# Patient Record
Sex: MALE | Race: WHITE | NOT HISPANIC OR LATINO | Employment: FULL TIME | URBAN - METROPOLITAN AREA
[De-identification: names, ages, dates, MRNs, and addresses within clinical notes are randomized per-mention and may not be internally consistent; named-entity substitution may affect disease eponyms.]

---

## 2017-10-01 ENCOUNTER — HOSPITAL ENCOUNTER (EMERGENCY)
Facility: CLINIC | Age: 42
Discharge: HOME | End: 2017-10-01
Attending: EMERGENCY MEDICINE | Admitting: EMERGENCY MEDICINE
Payer: COMMERCIAL

## 2017-10-01 VITALS
WEIGHT: 200 LBS | SYSTOLIC BLOOD PRESSURE: 129 MMHG | RESPIRATION RATE: 18 BRPM | OXYGEN SATURATION: 96 % | DIASTOLIC BLOOD PRESSURE: 99 MMHG | TEMPERATURE: 97.7 F | BODY MASS INDEX: 29.97 KG/M2

## 2017-10-01 DIAGNOSIS — R11.10 VOMITING AND DIARRHEA: ICD-10-CM

## 2017-10-01 DIAGNOSIS — R19.7 VOMITING AND DIARRHEA: ICD-10-CM

## 2017-10-01 LAB
ANION GAP SERPL CALCULATED.3IONS-SCNC: 7 MMOL/L (ref 3–14)
BASOPHILS # BLD AUTO: 0 10E9/L (ref 0–0.2)
BASOPHILS NFR BLD AUTO: 0.3 %
BUN SERPL-MCNC: 17 MG/DL (ref 7–30)
CALCIUM SERPL-MCNC: 9.5 MG/DL (ref 8.5–10.1)
CHLORIDE SERPL-SCNC: 103 MMOL/L (ref 94–109)
CO2 SERPL-SCNC: 27 MMOL/L (ref 20–32)
CREAT SERPL-MCNC: 1.01 MG/DL (ref 0.66–1.25)
DIFFERENTIAL METHOD BLD: NORMAL
EOSINOPHIL # BLD AUTO: 0.1 10E9/L (ref 0–0.7)
EOSINOPHIL NFR BLD AUTO: 1.1 %
ERYTHROCYTE [DISTWIDTH] IN BLOOD BY AUTOMATED COUNT: 13.5 % (ref 10–15)
GFR SERPL CREATININE-BSD FRML MDRD: 80 ML/MIN/1.7M2
GLUCOSE SERPL-MCNC: 138 MG/DL (ref 70–99)
HCT VFR BLD AUTO: 50.1 % (ref 40–53)
HGB BLD-MCNC: 17.3 G/DL (ref 13.3–17.7)
IMM GRANULOCYTES # BLD: 0 10E9/L (ref 0–0.4)
IMM GRANULOCYTES NFR BLD: 0.3 %
LYMPHOCYTES # BLD AUTO: 2.7 10E9/L (ref 0.8–5.3)
LYMPHOCYTES NFR BLD AUTO: 35.3 %
MCH RBC QN AUTO: 31.1 PG (ref 26.5–33)
MCHC RBC AUTO-ENTMCNC: 34.5 G/DL (ref 31.5–36.5)
MCV RBC AUTO: 90 FL (ref 78–100)
MONOCYTES # BLD AUTO: 0.3 10E9/L (ref 0–1.3)
MONOCYTES NFR BLD AUTO: 4.5 %
NEUTROPHILS # BLD AUTO: 4.4 10E9/L (ref 1.6–8.3)
NEUTROPHILS NFR BLD AUTO: 58.5 %
NRBC # BLD AUTO: 0 10*3/UL
NRBC BLD AUTO-RTO: 0 /100
PLATELET # BLD AUTO: 286 10E9/L (ref 150–450)
POTASSIUM SERPL-SCNC: 4.1 MMOL/L (ref 3.4–5.3)
RBC # BLD AUTO: 5.56 10E12/L (ref 4.4–5.9)
SODIUM SERPL-SCNC: 137 MMOL/L (ref 133–144)
WBC # BLD AUTO: 7.5 10E9/L (ref 4–11)

## 2017-10-01 PROCEDURE — 96375 TX/PRO/DX INJ NEW DRUG ADDON: CPT

## 2017-10-01 PROCEDURE — 25000128 H RX IP 250 OP 636: Performed by: EMERGENCY MEDICINE

## 2017-10-01 PROCEDURE — 85025 COMPLETE CBC W/AUTO DIFF WBC: CPT | Performed by: EMERGENCY MEDICINE

## 2017-10-01 PROCEDURE — 96361 HYDRATE IV INFUSION ADD-ON: CPT

## 2017-10-01 PROCEDURE — 99284 EMERGENCY DEPT VISIT MOD MDM: CPT | Mod: 25

## 2017-10-01 PROCEDURE — 80048 BASIC METABOLIC PNL TOTAL CA: CPT | Performed by: EMERGENCY MEDICINE

## 2017-10-01 PROCEDURE — 96374 THER/PROPH/DIAG INJ IV PUSH: CPT

## 2017-10-01 RX ORDER — METOCLOPRAMIDE HYDROCHLORIDE 5 MG/ML
10 INJECTION INTRAMUSCULAR; INTRAVENOUS ONCE
Status: COMPLETED | OUTPATIENT
Start: 2017-10-01 | End: 2017-10-01

## 2017-10-01 RX ORDER — ONDANSETRON 2 MG/ML
4 INJECTION INTRAMUSCULAR; INTRAVENOUS
Status: DISCONTINUED | OUTPATIENT
Start: 2017-10-01 | End: 2017-10-01 | Stop reason: HOSPADM

## 2017-10-01 RX ORDER — ONDANSETRON 4 MG/1
4 TABLET, ORALLY DISINTEGRATING ORAL EVERY 8 HOURS PRN
Qty: 10 TABLET | Refills: 0 | Status: SHIPPED | OUTPATIENT
Start: 2017-10-01 | End: 2017-10-04

## 2017-10-01 RX ORDER — DIPHENHYDRAMINE HYDROCHLORIDE 50 MG/ML
25 INJECTION INTRAMUSCULAR; INTRAVENOUS ONCE
Status: COMPLETED | OUTPATIENT
Start: 2017-10-01 | End: 2017-10-01

## 2017-10-01 RX ADMIN — SODIUM CHLORIDE 1000 ML: 9 INJECTION, SOLUTION INTRAVENOUS at 03:00

## 2017-10-01 RX ADMIN — DIPHENHYDRAMINE HYDROCHLORIDE 25 MG: 50 INJECTION, SOLUTION INTRAMUSCULAR; INTRAVENOUS at 03:42

## 2017-10-01 RX ADMIN — METOCLOPRAMIDE 10 MG: 5 INJECTION, SOLUTION INTRAMUSCULAR; INTRAVENOUS at 03:41

## 2017-10-01 NOTE — LETTER
To Whom it may concern:      Maximino KATHI Bone was seen in our Emergency Department today, 10/01/17. Please excuse him from work through 10/2/17.      Sincerely,      Unruly Ivan MD

## 2017-10-01 NOTE — ED AVS SNAPSHOT
[facility] Emergency Department    [address]    [facility] [address]    Phone:  [phone]    Fax:  [phone]                                       Maximino Bone   MRN: [id number]    Department:  [facility] Emergency Department   Date of Visit:  10/1/2017           After Visit Summary Signature Page     I have received my discharge instructions, and my questions have been answered. I have discussed any challenges I see with this plan with the nurse or doctor.    ..........................................................................................................................................  Patient/Patient Representative Signature      ..........................................................................................................................................  Patient Representative Print Name and Relationship to Patient    ..................................................               ................................................  Date                                            Time    ..........................................................................................................................................  Reviewed by Signature/Title    ...................................................              ..............................................  Date                                                            Time

## 2017-10-01 NOTE — ED NOTES
"Pt stated he ate \"bad steak\" at 1730 last night and started having nausea about 1800.  Worsening nausea and vomiting with weakness since then.  4 zofran given via EMS  "

## 2017-10-01 NOTE — DISCHARGE INSTRUCTIONS
Discharge Instructions  Vomiting and Diarrhea    You have been seen today for vomiting and diarrhea. This is usually caused by a virus, but some bacteria, parasites, medicines or other medical conditions can cause similar symptoms. At this time your doctor does not find that your vomiting and diarrhea is a sign of anything dangerous or life-threatening.  However, sometimes the signs of serious illness do not show up right away.  If you have new or worse symptoms, you may need to be seen again in the emergency department or by your primary doctor. Remember that serious problems like appendicitis can look like gastroenteritis at first.       Return to the Emergency Department if:    You keep throwing up and you are not able to keep liquids down.    You feel you are getting dehydrated, such as being very thirsty, not urinating at least every 8-12 hours, or feeling faint or lightheaded.     You develop a new fever, or your fever continues for more than 2 days.    You have belly pain that seems worse than cramps, is in one spot, or is getting worse over time.     You have blood in your vomit or in your diarrhea.    You feel very weak     You are not starting to improve within 24 hours of your visit here    What can I do to help myself?    The most important thing to do is to drink clear liquids.  If you have been vomiting a lot, it is best to have only small, frequent sips of liquids.  Drinking too much at once may cause more vomiting. If you are vomiting often, you must replace minerals, sodium and potassium lost with your illness. Pedialyte  and sports drinks can help you replace these minerals.  You can also drink clear liquids such as water, weak tea, apple juice, and 7-up. Avoid acid liquids (orange), caffeine (coffee) or alcohol. Do not drink milk until you no longer have diarrhea.    After liquids are staying down, you may start eating mild foods. Soda crackers, toast, plain noodles, gelatin, applesauce and  bananas are good first choices.  Avoid foods that have acid, are spicy, fatty or fibrous (such as meats, coarse grains, vegetables). You may start eating these foods again in about 3 days when you are better.    Sometimes treatment includes prescription medicine to prevent nausea and vomiting and to prevent diarrhea. If your doctor prescribes these for you, take them as directed.    Nonprescription medicine is available for the treatment of diarrhea and can be very effective.  If you use it, make sure you use the dose recommended on the package.  Avoid Lomotil. Check with your healthcare provider before you use any medicine for diarrhea.    Don t take ibuprofen, or other nonsteroidal anti-inflammatory medicines without checking with your healthcare provider.      Remember that you can always come back to the Emergency Department if you are not able to see your regular doctor in the amount of time listed above, if you get any new symptoms, or if there is anything that worries you.

## 2017-10-01 NOTE — ED PROVIDER NOTES
History     Chief Complaint:  Nausea & Vomiting      HPI   Maximino Bone is a 42 year old male who presents for evaluation of nausea, vomiting, and diarrhea. He notes that he began to experience nausea after eating a steak last night at 5:30. Since then he has had multiple episodes of uncontrolled vomiting, and one episode of diarrhea. He denies fevers, abdominal, or any other concerns.    Allergies:  No known drug allergies.      Medications:    The patient is currently on no regular medications.     Past Medical History:    Anxiety   Hyperlipidemia    Past Surgical History:    History reviewed. No pertinent past surgical history.     Family History:    Diabetes--Mother  Hypertension--Mother   Autism--Son  ADD--Daughter     Social History:  Marital Status: Single  Tobacco Use: Never smoker, current chewing tobacco user  Alcohol Use: Socially   PCP: Jackie Kumar     Review of Systems  GI: Positive as per above  All other systems reviewed and negative      Physical Exam   First Vitals:  BP: (!) 145/107  Heart Rate: 75  Resp: 18  Weight: 90.7 kg (200 lb)  SpO2: 94 %      Physical Exam  Constitutional: Alert, attentive  HENT:    Nose: Nose normal.    Mouth/Throat: Oropharynx is clear, mucous membranes are moist  Eyes:  EOM are normal. Pupils are equal, round, and reactive to light.   CV:  regular rate and rhythm; no murmurs, rubs or gallups  Chest: Effort normal and breath sounds normal.   GI:   Epigastrium - no tenderness, no guarding   RUQ - no tenderness or Schroeder's sign   RLQ - No tenderness, no guarding, no Rovsing's sign   Suprapubic area - no tenderness, no guarding    LLQ - no tenderness, no guarding   LUQ - no tenderness, no guarding   No distension. Normal bowel sounds  MSK: Normal range of motion.   Neurological: Alert, attentive  Skin: Skin is warm and dry.      Emergency Department Course     Laboratory:  BMP: Glc 138, otherwise WNL (Creatinine 1.01)  CBC:  WBC 7.5, HGB 17.3, , otherwise  WNL     Interventions:  NS 1 Liter  Benadryl 25 mg IV  Reglan 10 mg IV  Zofran 4 mg IV    Emergency Department Course:  Nursing notes and vitals reviewed.  I performed an exam of the patient as documented above.   A peripheral IV was established. Blood was drawn from the patient. This was sent for laboratory testing, findings above.      Recheck of 04:20: The patient is feeling significantly better, soft nontender abdominal exam.    Impression & Plan        Medical Decision Making:  Maximino Bone is a 42 year old male who presents with acute nausea, vomiting and diarrhea, likely related to food poisoning given the history above. He has a benign abdominal exam without focal RLQ or LLQ tenderness to suggest diverticulitis or appendicitis, respectively, or other acute abdominal process. I did discuss the sometimes vague initial constellation of symptoms early in the course of acute abdominal processes, and the need for immediate return should pain or other concerning symptoms develop.  Symptoms are improved after IV fluids and symptomatic treatment. Vital signs have remained normal and stable throughout the ED course, and the abdominal re-exam remains benign. I believe he is safe for discharge in improved condition at this time with strict return precautions for recurrent vomiting, pain, fever or any other concerning symptoms.    Diagnosis:    ICD-10-CM    1. Vomiting and diarrhea R11.10     R19.7      Disposition:  Home in improved condition      Unruly Ivan MD  [facility] Emergency Department      Scribe disclosure:  I, Bhaskar Ness, am serving as a scribe on 10/1/2017 at 3:11 AM to personally document services performed by Dr. Ivan based on my observations and the provider's statements to me.    [facility] EMERGENCY DEPARTMENT       Unruly Ivan MD  10/01/17 0614

## 2017-12-17 ENCOUNTER — APPOINTMENT (OUTPATIENT)
Dept: CT IMAGING | Facility: CLINIC | Age: 42
DRG: 603 | End: 2017-12-17
Attending: EMERGENCY MEDICINE
Payer: COMMERCIAL

## 2017-12-17 ENCOUNTER — HOSPITAL ENCOUNTER (INPATIENT)
Facility: CLINIC | Age: 42
LOS: 1 days | Discharge: HOME | DRG: 603 | End: 2017-12-18
Attending: EMERGENCY MEDICINE | Admitting: INTERNAL MEDICINE
Payer: COMMERCIAL

## 2017-12-17 DIAGNOSIS — L03.211 FACIAL CELLULITIS: ICD-10-CM

## 2017-12-17 LAB
ALBUMIN SERPL-MCNC: 3.4 G/DL (ref 3.4–5)
ALP SERPL-CCNC: 102 U/L (ref 40–150)
ALT SERPL W P-5'-P-CCNC: 29 U/L (ref 0–70)
ANION GAP SERPL CALCULATED.3IONS-SCNC: 7 MMOL/L (ref 3–14)
AST SERPL W P-5'-P-CCNC: 27 U/L (ref 0–45)
BASOPHILS # BLD AUTO: 0.1 10E9/L (ref 0–0.2)
BASOPHILS NFR BLD AUTO: 0.5 %
BILIRUB SERPL-MCNC: 1.2 MG/DL (ref 0.2–1.3)
BUN SERPL-MCNC: 11 MG/DL (ref 7–30)
CALCIUM SERPL-MCNC: 8.3 MG/DL (ref 8.5–10.1)
CHLORIDE SERPL-SCNC: 102 MMOL/L (ref 94–109)
CO2 SERPL-SCNC: 25 MMOL/L (ref 20–32)
CREAT SERPL-MCNC: 0.81 MG/DL (ref 0.66–1.25)
DIFFERENTIAL METHOD BLD: ABNORMAL
EOSINOPHIL # BLD AUTO: 0.1 10E9/L (ref 0–0.7)
EOSINOPHIL NFR BLD AUTO: 1 %
ERYTHROCYTE [DISTWIDTH] IN BLOOD BY AUTOMATED COUNT: 12.7 % (ref 10–15)
GFR SERPL CREATININE-BSD FRML MDRD: >90 ML/MIN/1.7M2
GLUCOSE SERPL-MCNC: 104 MG/DL (ref 70–99)
HCT VFR BLD AUTO: 41.4 % (ref 40–53)
HGB BLD-MCNC: 14.6 G/DL (ref 13.3–17.7)
IMM GRANULOCYTES # BLD: 0 10E9/L (ref 0–0.4)
IMM GRANULOCYTES NFR BLD: 0.3 %
LACTATE BLD-SCNC: 0.9 MMOL/L (ref 0.7–2)
LYMPHOCYTES # BLD AUTO: 1.6 10E9/L (ref 0.8–5.3)
LYMPHOCYTES NFR BLD AUTO: 15 %
MCH RBC QN AUTO: 31.6 PG (ref 26.5–33)
MCHC RBC AUTO-ENTMCNC: 35.3 G/DL (ref 31.5–36.5)
MCV RBC AUTO: 90 FL (ref 78–100)
MONOCYTES # BLD AUTO: 1.4 10E9/L (ref 0–1.3)
MONOCYTES NFR BLD AUTO: 13 %
NEUTROPHILS # BLD AUTO: 7.3 10E9/L (ref 1.6–8.3)
NEUTROPHILS NFR BLD AUTO: 70.2 %
NRBC # BLD AUTO: 0 10*3/UL
NRBC BLD AUTO-RTO: 0 /100
PLATELET # BLD AUTO: 223 10E9/L (ref 150–450)
POTASSIUM SERPL-SCNC: 3.7 MMOL/L (ref 3.4–5.3)
PROT SERPL-MCNC: 7.5 G/DL (ref 6.8–8.8)
RBC # BLD AUTO: 4.62 10E12/L (ref 4.4–5.9)
SODIUM SERPL-SCNC: 134 MMOL/L (ref 133–144)
WBC # BLD AUTO: 10.4 10E9/L (ref 4–11)

## 2017-12-17 PROCEDURE — 25000128 H RX IP 250 OP 636: Performed by: EMERGENCY MEDICINE

## 2017-12-17 PROCEDURE — 36415 COLL VENOUS BLD VENIPUNCTURE: CPT | Performed by: EMERGENCY MEDICINE

## 2017-12-17 PROCEDURE — 96365 THER/PROPH/DIAG IV INF INIT: CPT | Mod: 59

## 2017-12-17 PROCEDURE — 83605 ASSAY OF LACTIC ACID: CPT | Performed by: EMERGENCY MEDICINE

## 2017-12-17 PROCEDURE — 25000132 ZZH RX MED GY IP 250 OP 250 PS 637: Performed by: INTERNAL MEDICINE

## 2017-12-17 PROCEDURE — 25000125 ZZHC RX 250: Performed by: INTERNAL MEDICINE

## 2017-12-17 PROCEDURE — 87040 BLOOD CULTURE FOR BACTERIA: CPT | Performed by: EMERGENCY MEDICINE

## 2017-12-17 PROCEDURE — 70487 CT MAXILLOFACIAL W/DYE: CPT

## 2017-12-17 PROCEDURE — 12000011 ZZH R&B MS OVERFLOW

## 2017-12-17 PROCEDURE — 36415 COLL VENOUS BLD VENIPUNCTURE: CPT

## 2017-12-17 PROCEDURE — 80053 COMPREHEN METABOLIC PANEL: CPT | Performed by: EMERGENCY MEDICINE

## 2017-12-17 PROCEDURE — 99223 1ST HOSP IP/OBS HIGH 75: CPT | Performed by: INTERNAL MEDICINE

## 2017-12-17 PROCEDURE — 99285 EMERGENCY DEPT VISIT HI MDM: CPT | Mod: 25

## 2017-12-17 PROCEDURE — 85025 COMPLETE CBC W/AUTO DIFF WBC: CPT | Performed by: EMERGENCY MEDICINE

## 2017-12-17 PROCEDURE — 96375 TX/PRO/DX INJ NEW DRUG ADDON: CPT

## 2017-12-17 RX ORDER — DEXAMETHASONE SODIUM PHOSPHATE 4 MG/ML
4 INJECTION, SOLUTION INTRA-ARTICULAR; INTRALESIONAL; INTRAMUSCULAR; INTRAVENOUS; SOFT TISSUE EVERY 6 HOURS
Status: DISCONTINUED | OUTPATIENT
Start: 2017-12-18 | End: 2017-12-18 | Stop reason: HOSPADM

## 2017-12-17 RX ORDER — HYDROMORPHONE HYDROCHLORIDE 1 MG/ML
0.5 INJECTION, SOLUTION INTRAMUSCULAR; INTRAVENOUS; SUBCUTANEOUS ONCE
Status: COMPLETED | OUTPATIENT
Start: 2017-12-17 | End: 2017-12-17

## 2017-12-17 RX ORDER — IBUPROFEN 600 MG/1
600 TABLET, FILM COATED ORAL EVERY 6 HOURS PRN
Status: DISCONTINUED | OUTPATIENT
Start: 2017-12-17 | End: 2017-12-18 | Stop reason: HOSPADM

## 2017-12-17 RX ORDER — IOPAMIDOL 755 MG/ML
500 INJECTION, SOLUTION INTRAVASCULAR ONCE
Status: COMPLETED | OUTPATIENT
Start: 2017-12-17 | End: 2017-12-17

## 2017-12-17 RX ORDER — AMOXICILLIN AND CLAVULANATE POTASSIUM 562.5; 437.5; 62.5 MG/1; MG/1; MG/1
2 TABLET, MULTILAYER, EXTENDED RELEASE ORAL 2 TIMES DAILY
COMMUNITY
End: 2017-12-17

## 2017-12-17 RX ORDER — DEXAMETHASONE SODIUM PHOSPHATE 10 MG/ML
6 INJECTION, SOLUTION INTRAMUSCULAR; INTRAVENOUS ONCE
Status: COMPLETED | OUTPATIENT
Start: 2017-12-17 | End: 2017-12-17

## 2017-12-17 RX ORDER — ONDANSETRON 2 MG/ML
4 INJECTION INTRAMUSCULAR; INTRAVENOUS EVERY 6 HOURS PRN
Status: DISCONTINUED | OUTPATIENT
Start: 2017-12-17 | End: 2017-12-18 | Stop reason: HOSPADM

## 2017-12-17 RX ORDER — ACETAMINOPHEN 325 MG/1
650 TABLET ORAL EVERY 4 HOURS PRN
Status: DISCONTINUED | OUTPATIENT
Start: 2017-12-17 | End: 2017-12-18 | Stop reason: HOSPADM

## 2017-12-17 RX ORDER — PROCHLORPERAZINE MALEATE 5 MG
10 TABLET ORAL EVERY 6 HOURS PRN
Status: DISCONTINUED | OUTPATIENT
Start: 2017-12-17 | End: 2017-12-18 | Stop reason: HOSPADM

## 2017-12-17 RX ORDER — LORAZEPAM 0.5 MG/1
1 TABLET ORAL EVERY 8 HOURS PRN
Status: DISCONTINUED | OUTPATIENT
Start: 2017-12-17 | End: 2017-12-17

## 2017-12-17 RX ORDER — ALBUTEROL SULFATE 90 UG/1
2 AEROSOL, METERED RESPIRATORY (INHALATION)
Status: DISCONTINUED | OUTPATIENT
Start: 2017-12-17 | End: 2017-12-18 | Stop reason: HOSPADM

## 2017-12-17 RX ORDER — IBUPROFEN 600 MG/1
TABLET, FILM COATED ORAL EVERY 6 HOURS PRN
COMMUNITY

## 2017-12-17 RX ORDER — HYDROCODONE BITARTRATE AND ACETAMINOPHEN 5; 325 MG/1; MG/1
1-2 TABLET ORAL EVERY 6 HOURS PRN
Status: ON HOLD | COMMUNITY
End: 2017-12-18

## 2017-12-17 RX ORDER — AMPICILLIN AND SULBACTAM 2; 1 G/1; G/1
3 INJECTION, POWDER, FOR SOLUTION INTRAMUSCULAR; INTRAVENOUS EVERY 6 HOURS
Status: DISCONTINUED | OUTPATIENT
Start: 2017-12-18 | End: 2017-12-18 | Stop reason: HOSPADM

## 2017-12-17 RX ORDER — CLINDAMYCIN PHOSPHATE 900 MG/50ML
900 INJECTION, SOLUTION INTRAVENOUS EVERY 8 HOURS
Status: DISCONTINUED | OUTPATIENT
Start: 2017-12-17 | End: 2017-12-18 | Stop reason: HOSPADM

## 2017-12-17 RX ORDER — AMPICILLIN AND SULBACTAM 2; 1 G/1; G/1
3 INJECTION, POWDER, FOR SOLUTION INTRAMUSCULAR; INTRAVENOUS ONCE
Status: COMPLETED | OUTPATIENT
Start: 2017-12-17 | End: 2017-12-17

## 2017-12-17 RX ORDER — NALOXONE HYDROCHLORIDE 0.4 MG/ML
.1-.4 INJECTION, SOLUTION INTRAMUSCULAR; INTRAVENOUS; SUBCUTANEOUS
Status: DISCONTINUED | OUTPATIENT
Start: 2017-12-17 | End: 2017-12-18 | Stop reason: HOSPADM

## 2017-12-17 RX ORDER — HYDROMORPHONE HYDROCHLORIDE 1 MG/ML
.3-.5 INJECTION, SOLUTION INTRAMUSCULAR; INTRAVENOUS; SUBCUTANEOUS
Status: DISCONTINUED | OUTPATIENT
Start: 2017-12-17 | End: 2017-12-18 | Stop reason: HOSPADM

## 2017-12-17 RX ORDER — ONDANSETRON 4 MG/1
4 TABLET, ORALLY DISINTEGRATING ORAL EVERY 6 HOURS PRN
Status: DISCONTINUED | OUTPATIENT
Start: 2017-12-17 | End: 2017-12-18 | Stop reason: HOSPADM

## 2017-12-17 RX ORDER — KETOROLAC TROMETHAMINE 30 MG/ML
30 INJECTION, SOLUTION INTRAMUSCULAR; INTRAVENOUS ONCE
Status: COMPLETED | OUTPATIENT
Start: 2017-12-17 | End: 2017-12-17

## 2017-12-17 RX ORDER — PROCHLORPERAZINE 25 MG
25 SUPPOSITORY, RECTAL RECTAL EVERY 12 HOURS PRN
Status: DISCONTINUED | OUTPATIENT
Start: 2017-12-17 | End: 2017-12-18 | Stop reason: HOSPADM

## 2017-12-17 RX ADMIN — AMPICILLIN SODIUM AND SULBACTAM SODIUM 3 G: 2; 1 INJECTION, POWDER, FOR SOLUTION INTRAMUSCULAR; INTRAVENOUS at 19:37

## 2017-12-17 RX ADMIN — SODIUM CHLORIDE 60 ML: 9 INJECTION, SOLUTION INTRAVENOUS at 17:42

## 2017-12-17 RX ADMIN — IOPAMIDOL 80 ML: 755 INJECTION, SOLUTION INTRAVENOUS at 17:42

## 2017-12-17 RX ADMIN — IBUPROFEN 600 MG: 600 TABLET ORAL at 22:54

## 2017-12-17 RX ADMIN — DEXAMETHASONE SODIUM PHOSPHATE 6 MG: 10 INJECTION, SOLUTION INTRAMUSCULAR; INTRAVENOUS at 19:38

## 2017-12-17 RX ADMIN — Medication 0.5 MG: at 21:09

## 2017-12-17 RX ADMIN — KETOROLAC TROMETHAMINE 30 MG: 30 INJECTION, SOLUTION INTRAMUSCULAR at 17:18

## 2017-12-17 RX ADMIN — CLINDAMYCIN PHOSPHATE 900 MG: 18 INJECTION, SOLUTION INTRAVENOUS at 22:04

## 2017-12-17 ASSESSMENT — ACTIVITIES OF DAILY LIVING (ADL)
RETIRED_EATING: 0-->INDEPENDENT
AMBULATION: 0-->INDEPENDENT
RETIRED_COMMUNICATION: 0-->UNDERSTANDS/COMMUNICATES WITHOUT DIFFICULTY
TRANSFERRING: 0-->INDEPENDENT
BATHING: 0-->INDEPENDENT
TOILETING: 0-->INDEPENDENT
FALL_HISTORY_WITHIN_LAST_SIX_MONTHS: NO
DRESS: 0-->INDEPENDENT
SWALLOWING: 0-->SWALLOWS FOODS/LIQUIDS WITHOUT DIFFICULTY
COGNITION: 0 - NO COGNITION ISSUES REPORTED

## 2017-12-17 ASSESSMENT — ENCOUNTER SYMPTOMS
FACIAL SWELLING: 1
FEVER: 0

## 2017-12-17 NOTE — IP AVS SNAPSHOT
[facility]    [address]    [facility] [address]    Phone:  [phone]    Fax:  [phone]                                       After Visit Summary   12/17/2017    Maximino Bone    MRN: [id number]           After Visit Summary Signature Page     I have received my discharge instructions, and my questions have been answered. I have discussed any challenges I see with this plan with the nurse or doctor.    ..........................................................................................................................................  Patient/Patient Representative Signature      ..........................................................................................................................................  Patient Representative Print Name and Relationship to Patient    ..................................................               ................................................  Date                                            Time    ..........................................................................................................................................  Reviewed by Signature/Title    ...................................................              ..............................................  Date                                                            Time

## 2017-12-17 NOTE — IP AVS SNAPSHOT
MRN:[id number]                      After Visit Summary   12/17/2017    Maximino Bone    MRN: [id number]           Thank you!     Thank you for choosing [facility] for your care. Our goal is always to provide you with excellent care. Hearing back from our patients is one way we can continue to improve our services. Please take a few minutes to complete the written survey that you may receive in the mail after you visit. If you would like to speak to someone directly about your visit please contact Patient Relations at [phone]. Thank you!          Patient Information     Date Of Birth          1975        Designated Caregiver       Most Recent Value    Caregiver    Will someone help with your care after discharge? no      About your hospital stay     You were admitted on:  December 17, 2017 You last received care in the:  [facility]    You were discharged on:  December 18, 2017        Reason for your hospital stay       You were admitted for a facial cellulitis which was drained by our ENT surgeon.  You now will continue your oral Augmentin at home to complete what will actually be an 11 day total course of antibiotics as well as a medrol dose pack to help with the swelling.                  Who to Call     For medical emergencies, please call 911.  For non-urgent questions about your medical care, please call your primary care provider or clinic, [phone]          Attending Provider     Provider Specialty    Chrissy Null MD Emergency Medicine    Pepito Beltran MD Internal Medicine       Primary Care Provider Office Phone # Fax #    [facility] [phone]      After Care Instructions     Activity       Your activity upon discharge: activity as tolerated but no driving or operating machinery if you have been taking Norco or other narcotic pain medications.            Diet       Follow this diet upon  "discharge: soft diet until symptoms completely resolved and you have seen the dentist.                  Follow-up Appointments     Follow-up and recommended labs and tests        Please follow up in dental clinic as recommended.  If symptoms worsen or you have wound issues please return to the ER or contact the ENT clinic as discussed.                  Pending Results     Date and Time Order Name Status Description    2017 1713 Blood culture Preliminary     2017 1700 Blood culture Preliminary             Statement of Approval     Ordered          17 1300  I have reviewed and agree with all the recommendations and orders detailed in this document.  EFFECTIVE NOW     Approved and electronically signed by:  Jerrod Swartz MD             Admission Information     Date & Time Provider Department Dept. Phone    2017 Pepito Beltran MD [facility] [phone]      Your Vitals Were     Blood Pressure Temperature Respirations Height Weight Pulse Oximetry    121/78 98.1  F (36.7  C) (Oral) 16 1.778 m (5' 10\") 88.9 kg (196 lb) 95%    BMI (Body Mass Index)                   28.12 kg/m2           [facility] Information     [facility] lets you send messages to your doctor, view your test results, renew your prescriptions, schedule appointments and more. To sign up, go to [email or web address] . Click on \"Log in\" on the left side of the screen, which will take you to the Welcome page. Then click on \"Sign up Now\" on the right side of the page.     You will be asked to enter the access code listed below, as well as some personal information. Please follow the directions to create your username and password.     Your access code is: [email or web address]  Expires: 2017  3:24 AM     Your access code will  in 90 days. If you need help or a new code, please call your [facility] or [phone].        Care EveryWhere ID     This is your Care EveryWhere ID. This could be " used by other organizations to access your [place] medical records  [id number]        Equal Access to Services     KALE CHRISTENSEN : Hadii joon Gray, sherry mckeon, hemant abumanmajose melgoza, dawson ferrer. So [facility] [phone].    ATENCIÓN: Si habla español, tiene a roque disposición servicios gratuitos de asistencia lingüística. Neyame al [phone].    We comply with applicable federal civil rights laws and [place] laws. We do not discriminate on the basis of race, color, national origin, age, disability, sex, sexual orientation, or gender identity.               Review of your medicines      START taking        Dose / Directions    acetaminophen 325 MG tablet   Commonly known as:  TYLENOL        Dose:  650 mg   Take 2 tablets (650 mg) by mouth every 4 hours as needed for mild pain   Quantity:  100 tablet   Refills:  0       methylPREDNISolone 4 MG tablet   Commonly known as:  MEDROL DOSEPAK        Follow package instructions   Quantity:  21 tablet   Refills:  0         CONTINUE these medicines which have NOT CHANGED        Dose / Directions    albuterol 108 (90 BASE) MCG/ACT Inhaler   Commonly known as:  PROAIR HFA/PROVENTIL HFA/VENTOLIN HFA        Dose:  2 puff   Inhale 2 puffs into the lungs every 2 hours as needed for shortness of breath / dyspnea   Quantity:  1 Inhaler   Refills:  0       amoxicillin-clavulanate 875-125 MG per tablet   Commonly known as:  AUGMENTIN        Dose:  1 tablet   Take 1 tablet by mouth 2 times daily Starting 12/16/2017 for 10 days   Refills:  0       HYDROcodone-acetaminophen 5-325 MG per tablet   Commonly known as:  NORCO        Dose:  1-2 tablet   Take 1-2 tablets by mouth every 6 hours as needed for moderate to severe pain   Refills:  0       ibuprofen 600 MG tablet   Commonly known as:  ADVIL/MOTRIN        Take by mouth every 6 hours as needed for moderate pain   Refills:  0            Where to get your medicines      These  medications were sent to [facility] [place], [place] - [address] [facility]  [address] [facility] [address]     Phone:  [phone]     methylPREDNISolone 4 MG tablet         Some of these will need a paper prescription and others can be bought over the counter. Ask your nurse if you have questions.     You don't need a prescription for these medications     acetaminophen 325 MG tablet               ANTIBIOTIC INSTRUCTION     You've Been Prescribed an Antibiotic - Now What?  Your healthcare team thinks that you or your loved one might have an infection. Some infections can be treated with antibiotics, which are powerful, life-saving drugs. Like all medications, antibiotics have side effects and should only be used when necessary. There are some important things you should know about your antibiotic treatment.      Your healthcare team may run tests before you start taking an antibiotic.    Your team may take samples (e.g., from your blood, urine or other areas) to run tests to look for bacteria. These test can be important to determine if you need an antibiotic at all and, if you do, which antibiotic will work best.      Within a few days, your healthcare team might change or even stop your antibiotic.    Your team may start you on an antibiotic while they are working to find out what is making you sick.    Your team might change your antibiotic because test results show that a different antibiotic would be better to treat your infection.    In some cases, once your team has more information, they learn that you do not need an antibiotic at all. They may find out that you don't have an infection, or that the antibiotic you're taking won't work against your infection. For example, an infection caused by a virus can't be treated with antibiotics. Staying on an antibiotic when you don't need it is more likely to be harmful than helpful.      You may experience side effects from your  antibiotic.    Like all medications, antibiotics have side effects. Some of these can be serious.    Let you healthcare team know if you have any known allergies when you are admitted to the hospital.    One significant side effect of nearly all antibiotics is the risk of severe and sometimes deadly diarrhea caused by Clostridium difficile (C. Difficile). This occurs when a person takes antibiotics because some good germs are destroyed. Antibiotic use allows C. diificile to take over, putting patients at high risk for this serious infection.    As a patient or caregiver, it is important to understand your or your loved one's antibiotic treatment. It is especially important for caregivers to speak up when patients can't speak for themselves. Here are some important questions to ask your healthcare team.    What infection is this antibiotic treating and how do you know I have that infection?    What side effects might occur from this antibiotic?    How long will I need to take this antibiotic?    Is it safe to take this antibiotic with other medications or supplements (e.g., vitamins) that I am taking?     Are there any special directions I need to know about taking this antibiotic? For example, should I take it with food?    How will I be monitored to know whether my infection is responding to the antibiotic?    What tests may help to make sure the right antibiotic is prescribed for me?      Information provided by:  www.cdc.gov/getsmart  U.S. Department of Health and Human Services  Centers for disease Control and Prevention  National Center for Emerging and Zoonotic Infectious Diseases  Division of Healthcare Quality Promotion         Protect others around you: Learn how to safely use, store and throw away your medicines at www.disposemymeds.org.             Medication List: This is a list of all your medications and when to take them. Check marks below indicate your daily home schedule. Keep this list as a  reference.      Medications           Morning Afternoon Evening Bedtime As Needed    acetaminophen 325 MG tablet   Commonly known as:  TYLENOL   Take 2 tablets (650 mg) by mouth every 4 hours as needed for mild pain                                   albuterol 108 (90 BASE) MCG/ACT Inhaler   Commonly known as:  PROAIR HFA/PROVENTIL HFA/VENTOLIN HFA   Inhale 2 puffs into the lungs every 2 hours as needed for shortness of breath / dyspnea                                   amoxicillin-clavulanate 875-125 MG per tablet   Commonly known as:  AUGMENTIN   Take 1 tablet by mouth 2 times daily Starting 12/16/2017 for 10 days                                      HYDROcodone-acetaminophen 5-325 MG per tablet   Commonly known as:  NORCO   Take 1-2 tablets by mouth every 6 hours as needed for moderate to severe pain                                   ibuprofen 600 MG tablet   Commonly known as:  ADVIL/MOTRIN   Take by mouth every 6 hours as needed for moderate pain   Last time this was given:  600 mg on 12/17/2017 10:54 PM                                   methylPREDNISolone 4 MG tablet   Commonly known as:  MEDROL DOSEPAK   Follow package instructions

## 2017-12-17 NOTE — ED PROVIDER NOTES
"  History     Chief Complaint:  Facial Swelling    HPI   Maximino Bone is a 42 year old male with a history of hyperlipidemia who presents to the emergency department today for evaluation of facial swelling. The patient reports upper left dental pain for the past 48 hours after biting into something and yesterday, was seen at urgent care with some minor swelling, diagnosed with an infection, and started on Augmentin and Norco. Today, the patient woke up unable to see out of left eye due to left facial swelling and reports diffuse pain originating near the upper left incisor. He presented to urgent care once again and was forwarded here. He denies any eye pain, fever, changes in PO intake, or changes in bowel movements or urination, and last took [place] at 1500 this afternoon. Of note, the patient has had a dental abscess on the lower right side in the past and drained it himself, though he has seen a dentist in recent memory.    Allergies:  No Known Drug Allergies    Medications:    Augmentin  Norco    Past Medical History:    Hyperlipidemia    Past Surgical History:    History reviewed. No pertinent past surgical history.    Family History:    Diabetes - mother  Hypertension - mother  Autism - son  ADD - daughter    Social History:  The patient presented to the ED alone.  Smoking Status: Never smoker  Smokeless Tobacco: Current user  Alcohol Use: Yes  Marital Status:  Single [1]    Review of Systems   Constitutional: Negative for fever.   HENT: Positive for dental problem and facial swelling.    Genitourinary: Negative.    All other systems reviewed and are negative.    Physical Exam   Vitals:  Patient Vitals for the past 24 hrs:   BP Temp Heart Rate SpO2 Height Weight   12/17/17 1945 125/84 - - 97 % - -   12/17/17 1730 (!) 136/101 - - 96 % - -   12/17/17 1630 (!) 146/106 98.4  F (36.9  C) 106 95 % 1.778 m (5' 10\") 95.3 kg (210 lb)     Physical Exam  Constitutional: The patient is oriented to person, place, and " time. Alert and cooperative.  HENT:   Right Ear: External ear normal.   Left Ear: External ear normal.   Nose: Nose normal.   Mouth/Throat: Poor dentition. Tenderness to palpation over tooth number 10. No fluctuance felt in the gingival/buccal fold. The posterior oropharynx is unremarkable. L sided facial swelling and erythema present over the L maxilla extending to the L periorbital region.  Eyes: Conjunctivae, EOM are normal. Pupils are equal, round, and reactive to light.   Neck: Trachea normal. Normal range of motion. Neck supple.   Cardiovascular: Normal rate, regular rhythm, normal heart sounds, and intact distal pulses.    Pulmonary/Chest: Effort normal and breath sounds equal bilaterally. No crackles or wheezing.   Abdominal: Soft. No tenderness. No rebound and no guarding.   Musculoskeletal: Normal range of motion.  No extremity tenderness or edema.  Neurological: Alert and Oriented. Strength 5/5 in upper and lower extremities bilaterally. Sensation intact to light touch throughout.  Skin: Skin is dry. No rash noted.          Emergency Department Course     Imaging:  Radiology findings were communicated with the patient who voiced understanding of the findings.    CT Maxillofacial w contrast  1. Lobulated hypodense collection adjacent to the left anterior aspect  of the maxilla possibly representing an abscess arising from the  mucosal space near the gingivobuccal sulcus. This does not appear to  be secondary to dental disease.  2. Cellulitis of the left face likely related to the presumed abscess.  3. Scattered mucosal thickening in the paranasal sinuses but no  definite evidence for acute sinusitis.  4. No facial bone fracture.  5. No evidence for intraorbital pathology.  Reading per radiology    Laboratory:  Laboratory findings were communicated with the patient who voiced understanding of the findings.    CBC: AWNL. (WBC 10.4, HGB 14.6, )   CMP: Glucose 104 (H), Calcium 8.3 (L) o/w WNL  (Creatinine 0.81)  Lactic Acid (Collected at 1655): 0.9  Blood culture: Pending x2    Interventions:  1718 Toradol 30 mg IV  1937 Unasyn 3 g IV  1938 Decadron 6 mg IV    Emergency Department Course:  Nursing notes and vitals reviewed.  1641: I performed an exam of the patient as documented above.   IV was inserted and blood was drawn for laboratory testing, results above.  The patient was sent for a CT Maxillofacial w contrast while in the emergency department, results above.   1856: I spoke with Dr. Lua of the ENT service from [facility] regarding patient's presentation and plan of care.  1920: I spoke with Dr. L[facility] of the [facility] service [facility] regarding patient's findings and plan of care.  1925: I rechecked the patient and personally reviewed the results of laboratory and imaging studies and plan of care. I discussed the treatment plan with the patient and he expressed understanding of this plan and consented to admission. All questions were answered prior to admission.  1940: I discussed the patient with Dr. Beltran of the hospitalist service, who will admit the patient to a monitored bed for further evaluation and treatment.    Impression & Plan      Medical Decision Making:  Maximino Bone is a 42 year old male with a history of hyperlipidemia who presents to the ED for evaluation of left facial swelling. Upon presentation in the ED, the patient is non-toxic appearing. The patient is hypertensive and tachycardic, but otherwise vitals are within normal limits and stable. On exam, the patient is well appearing. The patient is alert, oriented, and neuro exam is non-focal. Cardiopulmonary exam is unremarkable. The abdomen is soft and non-tender throughout. On facial examination, he does have diffuse erythema and swelling extending from the left periorbital region to the left maxillary region. He does have evidence of poor dentition. I do not appreciate any clear area  of fluctuance in the gingival/buccal fold. He does endorse tenderness to palpation over the left lateral upper incisor. He has no trismus. The rest of the exam is as mentioned above. Labs were obtained and are as noted above. Notably, he does not have a leukocytosis and lactate is within normal limits. CT of the max face does demonstrate a lobulated hypodense collection adjacent to the left anterior aspect of the maxilla possibly representing an abscess. Per radiology, this does not appear to be secondary to dental disease. He also has cellulitis of the left face. These results were discussed with the patient who states understanding. The patient was then discussed with Dr. Lua of ENT. He did review the patient's CT and does recommend IV antibiotic initiation and Decadron. He states that he does recommend that the patient be admitted to the hospital and he will evaluate the patient in the morning. He notes that he is unclear if there is obvious abscess, but he will evaluate this when he sees the patient in the hospital tomorrow. The patient was discussed with the hospitalist and he agreed to accept this patient. The patient was stable/improved at the time of admission.     Diagnosis:    ICD-10-CM    1. Facial cellulitis L03.211      Disposition:   Admission    Scribe Disclosure:  Stacey MATTA am serving as a scribe at 4:41 PM on 12/17/2017 to document services personally performed by Chrissy Null MD, based on my observations and the provider's statements to me.    12/17/2017   [facility] EMERGENCY DEPARTMENT       Chrissy Null MD  12/18/17 4520

## 2017-12-17 NOTE — ED NOTES
Pt arrives with left sided facial swelling. States he was seen on Friday for left upper tooth pain. Was started on Augmentin. Came in today because swelling has gotten worse, now left eye swollen shut. ABC intact.

## 2017-12-18 VITALS
WEIGHT: 196 LBS | TEMPERATURE: 98.1 F | OXYGEN SATURATION: 95 % | RESPIRATION RATE: 16 BRPM | BODY MASS INDEX: 28.06 KG/M2 | DIASTOLIC BLOOD PRESSURE: 78 MMHG | SYSTOLIC BLOOD PRESSURE: 121 MMHG | HEIGHT: 70 IN

## 2017-12-18 PROCEDURE — 25000125 ZZHC RX 250: Performed by: INTERNAL MEDICINE

## 2017-12-18 PROCEDURE — 25000128 H RX IP 250 OP 636: Performed by: INTERNAL MEDICINE

## 2017-12-18 PROCEDURE — 90732 PPSV23 VACC 2 YRS+ SUBQ/IM: CPT | Performed by: INTERNAL MEDICINE

## 2017-12-18 PROCEDURE — 0H91XZZ DRAINAGE OF FACE SKIN, EXTERNAL APPROACH: ICD-10-PCS | Performed by: OTOLARYNGOLOGY

## 2017-12-18 PROCEDURE — 99239 HOSP IP/OBS DSCHRG MGMT >30: CPT | Performed by: INTERNAL MEDICINE

## 2017-12-18 PROCEDURE — 90686 IIV4 VACC NO PRSV 0.5 ML IM: CPT | Performed by: INTERNAL MEDICINE

## 2017-12-18 RX ORDER — LIDOCAINE HYDROCHLORIDE ANHYDROUS AND EPINEPHRINE 10; 10 MG/ML; UG/ML
30 INJECTION, SOLUTION INFILTRATION ONCE
Status: DISCONTINUED | OUTPATIENT
Start: 2017-12-18 | End: 2017-12-18 | Stop reason: HOSPADM

## 2017-12-18 RX ORDER — HYDROCODONE BITARTRATE AND ACETAMINOPHEN 5; 325 MG/1; MG/1
1-2 TABLET ORAL EVERY 6 HOURS PRN
Qty: 18 TABLET | Refills: 0 | Status: SHIPPED | OUTPATIENT
Start: 2017-12-18

## 2017-12-18 RX ORDER — METHYLPREDNISOLONE 4 MG
TABLET, DOSE PACK ORAL
Qty: 21 TABLET | Refills: 0 | Status: SHIPPED | OUTPATIENT
Start: 2017-12-18

## 2017-12-18 RX ORDER — ACETAMINOPHEN 325 MG/1
650 TABLET ORAL EVERY 4 HOURS PRN
Qty: 100 TABLET
Start: 2017-12-18

## 2017-12-18 RX ADMIN — AMPICILLIN SODIUM AND SULBACTAM SODIUM 3 G: 2; 1 INJECTION, POWDER, FOR SOLUTION INTRAMUSCULAR; INTRAVENOUS at 07:48

## 2017-12-18 RX ADMIN — INFLUENZA A VIRUS A/MICHIGAN/45/2015 X-275 (H1N1) ANTIGEN (FORMALDEHYDE INACTIVATED), INFLUENZA A VIRUS A/HONG KONG/4801/2014 X-263B (H3N2) ANTIGEN (FORMALDEHYDE INACTIVATED), INFLUENZA B VIRUS B/PHUKET/3073/2013 ANTIGEN (FORMALDEHYDE INACTIVATED), AND INFLUENZA B VIRUS B/BRISBANE/60/2008 ANTIGEN (FORMALDEHYDE INACTIVATED) 0.5 ML: 15; 15; 15; 15 INJECTION, SUSPENSION INTRAMUSCULAR at 09:22

## 2017-12-18 RX ADMIN — PNEUMOCOCCAL VACCINE POLYVALENT 0.5 ML
25; 25; 25; 25; 25; 25; 25; 25; 25; 25; 25; 25; 25; 25; 25; 25; 25; 25; 25; 25; 25; 25; 25 INJECTION, SOLUTION INTRAMUSCULAR; SUBCUTANEOUS at 09:48

## 2017-12-18 RX ADMIN — CLINDAMYCIN PHOSPHATE 900 MG: 18 INJECTION, SOLUTION INTRAVENOUS at 06:10

## 2017-12-18 RX ADMIN — Medication 0.5 MG: at 07:54

## 2017-12-18 RX ADMIN — Medication 0.5 MG: at 00:15

## 2017-12-18 RX ADMIN — DEXAMETHASONE SODIUM PHOSPHATE 4 MG: 4 INJECTION, SOLUTION INTRAMUSCULAR; INTRAVENOUS at 02:28

## 2017-12-18 RX ADMIN — DEXAMETHASONE SODIUM PHOSPHATE 4 MG: 4 INJECTION, SOLUTION INTRAMUSCULAR; INTRAVENOUS at 07:46

## 2017-12-18 RX ADMIN — AMPICILLIN SODIUM AND SULBACTAM SODIUM 3 G: 2; 1 INJECTION, POWDER, FOR SOLUTION INTRAMUSCULAR; INTRAVENOUS at 02:28

## 2017-12-18 RX ADMIN — Medication 0.5 MG: at 04:31

## 2017-12-18 ASSESSMENT — PAIN DESCRIPTION - DESCRIPTORS
DESCRIPTORS: SHARP
DESCRIPTORS: SHARP

## 2017-12-18 NOTE — PLAN OF CARE
Problem: Patient Care Overview  Goal: Plan of Care/Patient Progress Review  Outcome: No Change  VSS. Afebrile. Tolerated pudding. Drinking water. NPO at midnight. Pain in right side of face with tingling. Ibuprofen x 1 for pain. Edema throughout right side of face including surrounding eye. No redness or discoloration noted. 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 RN encouraged patient to have children come stay at hospital. Patient reported they are content and settled for tonight at home. Social work consult placed to offer support and resources. Plan for ENT to see patient in AM. IV antibiotics given. Saline locked between doses.

## 2017-12-18 NOTE — CONSULTS
Pt admitted overnight for facial cellulitis and possible left gingivobuccal/cheek abscess. Pt has previous hx of maxillary tooth abscess. Pt cellulitis dramatically improved on iv unasyn and steroids. Pt has an autistic child at home that he needs to care for and desires to get out of the hospital. Clinicaly exam shows fluctuance in left maxillary gingivobuccal sulcus and along left maxillary face in addition to poor dentition.    Plan: will I and D cheek abscess over lunch. Nursing staff to have supplies at bedside. AFter I and D pt should be able to be d/c on oral augmentin and a medrol dosepack. He needs to see a dentist or oral surgery to evaluate his teeth.

## 2017-12-18 NOTE — PLAN OF CARE
Problem: Patient Care Overview  Goal: Plan of Care/Patient Progress Review  Outcome: No Change  VSS, afebrile. NPO since midnight. Dilaudid x2 given overnight for left-sided face pain rated 4-8/10. Swelling has decreased since arrival to unit last evening, especially around eye area, and pain has migrated from the eye area to a more focused area near the mouth and gum/teeth area inside mouth. Still no redness or discoloration noted. Pt still voices concerns about being away from his children and about not being able to work at his job d/t this hospitalization. S.W. consult in place. Plan for ENT to see patient this AM. Continuing with IV antibiotics, saline locked between doses. Will continue to monitor and provide for needs.

## 2017-12-18 NOTE — PROGRESS NOTES
S: Pt seen over lunch for I and D. Continues to do well.     O: B/P: 121/78, T: 98.1, P: Data Unavailable, R: 16  Exam: fluctuance of left maxillary gingivobuccal sulcus  Procedure: after informed consent was reviewed 6cc of1%lidocaine with 1/100,000 of epinephrine was injected into the left cheek. Next an 11 blade scalpel was used to incise the left gingivobuccal sulcus and immediately thick purulence was recovered.  A curved hemostat was used to open the abscess cavity and then the cavity was irrigated with 20cc of saline. The patient tolerated the procedure well with minimal blood loss.    A/P: left cheek abscess s/p I and D. Either tramautic or odontogenic.  -Okay to d/c this afternoon  -Soft diet as tolerated  -Please RX augmentin 875/125 bid for 10 days, a medrol dosepack, and norco5/325 q 6 hours prn pain #20 tablets.  -Would recommend he see a dentist or oral surgery to evaluate for odontogenic source.  -if symptoms worsen, Follow up  with ENT. Call [phone] to arrange follow up either [place] or [facility] [place] [facility]. If symptoms resolved, no ENT f/u necessary.

## 2017-12-18 NOTE — H&P
CHIEF COMPLAINT:  Left facial swelling.      HISTORY OF PRESENT ILLNESS:  Mr. Maximino Bone is a 42-year-old gentleman with past medical history significant for asthma and anxiety who presents to the Emergency department today for evaluation of left facial swelling.  The patient had left dental pain 3 days ago after he chewed hard food on it, and since then he started to have pain and noticed some swelling of his face on the left side 2 days ago and went to an Urgent Care center where he was diagnosed with possible periodontal infection, possible abscess and was started on Augmentin and Norco.  Today the patient woke up with significant swelling of the face on the left side involving the left periorbital area with diffuse pain.  The patient said he grinds on his teeth  and almost does not have  part of of his teeth due to grinding.  He states he does this when he stressed.  He denied any fever or chills.  No discharge from eyes.  No sore throat, speech or swallowing difficulty.  He denied any bowel or urinary habit change.  The patient took his Augmentin as prescribed.  Despite that, the swelling got worse and he came to the Emergency Room.  I discussed with Dr. Null, ED physician, who also discussed with the on-call ENT specialist who will see the patient in the morning.      PAST MEDICAL HISTORY:  Asthma, anxiety.      PAST SURGICAL HISTORY:  None.      FAMILY HISTORY:  Significant for his mother with diabetes and hypertension.      SOCIAL HISTORY:  He is in the Emergency Room.  He was a former smoker, currently uses smokeless tobacco.  He occasionally drinks alcohol.  He is a single parent for 2 kids, one 18-year-old and one 20-year-old.      REVIEW OF SYSTEMS:  Ten points reviewed, all are negative except what was mentioned in the history of present illness.      HOME MEDICATIONS:  Include:   1.  Albuterol 2 puff inhalation every 2 hours.   2.  Lorazepam 1 mg p.o. every 8 hours as needed.   3.  Augmentin.     4.  Doxycycline.    5.  Hydrocodone.      ALLERGIES:  No known drug allergies.      PHYSICAL EXAMINATION:   GENERAL:  The patient is awake, alert, oriented.  Left facial swelling including the periorbital area noted.   VITAL SIGNS:  Blood pressure 125/84, pulse rate 106, temperature 98.4, oxygen saturation 97% on room air.   HEENT:  Pink, nonicteric.  Left periorbital area and maxillary area significant swelling.  The patient able to open his mouth.  No speech difficulty.  No tongue swelling.   NECK:  Supple.  No submandibular or pericardial lymph nodes.   CHEST:  Good air entry bilaterally.  No wheezing, crackles or rales.   CARDIOVASCULAR:  S1, S2 were heard.  No gallop or murmur.   ABDOMEN:  Soft, nontender, nondistended.  Positive bowel sounds.  No organomegaly.   EXTREMITIES:  No edema, cyanosis or clubbing.   NEUROLOGIC:  No focal neurologic deficit.  Cranial nerves grossly intact.   PSYCHIATRIC:  Normal mood and affect.  Keeps eye contact.  No anxiety or agitation.      LABS:  Sodium 134, potassium 3.7, BUN 11, creatinine 0.8, albumin 3.4.  Liver function tests normal.  WBC 10.4, hemoglobin 14.6, platelets 223.  Blood culture done.  CT scan, maxillofacial, showed oblique hypodense collection adjacent to the left anterior aspect of the maxilla, possibly representing abscess arising from the mucosal space near the gingival buccal sulcus; cellulitis of the left face, likely related to the presumed abscess; scattered mucosal thickening in the paranasal sinus.       ASSESSMENT:  Maximino Bone is a 42-year-old gentleman with history of asthma and anxiety, issues with his teeth, grinding his teeth with significant damage, who presented today with left facial swelling and periorbital swelling as well as pain, and being admitted to the hospital as he failed outpatient treatment, as an inpatient.   1.  Left side facial cellulitis with possible organizing maxillary abscess.   2.  History of anxiety.      PLAN:  The  patient is being admitted as an inpatient.  Expect at least 2-night stay in the hospital. Started on IV fluids and IV antibiotic, Unasyn, added Clindamycin for double coverage.  Decadron was started 4 mg IV every 6 hours for today to help with inflammation. ENT will evaluate the patient in the morning.  At this point there is no significant abscess clinically, but CT scan is suggestive of some collection of fluid.  N.p.o. until patient is seen by ENT, and will evaluate the patient and decide if there is any collection to be drained.  The patient appears to be stable.  All his questions and concerns addressed, and he showed understanding.  In the event of cardiorespiratory arrest, he is full code.      One of my colleagues will see the patient in the morning.         NILE RODNEY MD             D: 2017 20:23   T: 2017 20:52   MT:       Name:     ZANE PETERS   MRN:      [id number]        Account:      [id number]   :      1975           Admitted:     [id number]      Document: [id number]

## 2017-12-18 NOTE — PLAN OF CARE
Problem: Skin and Soft Tissue Infection (Adult)  Goal: Signs and Symptoms of Listed Potential Problems Will be Absent, Minimized or Managed (Skin and Soft Tissue Infection)  Signs and symptoms of listed potential problems will be absent, minimized or managed by discharge/transition of care (reference Skin and Soft Tissue Infection (Adult) CPG).   Outcome: Adequate for Discharge Date Met: 12/18/17  Pt up in room independently and khalida well. Does have facial swelling and pain but better. ENT drained abscess and pt anxious to get home to his children. He is awaiting his meds and will be ready to leave. Did go over dc with pt and will follow up as needed.

## 2017-12-18 NOTE — DISCHARGE SUMMARY
[facility]  Discharge Summary  Name: Maximino Bone    MRN: [id number]  YOB: 1975    Age: 42 year old  Date of Discharge:  12/18/2017  Date of Admission: 12/17/2017  Primary Care Provider: Clinic, [facility]  Discharge Physician:  Gerald Swartz MD  Discharging Service:  Hospitalist      Discharge Diagnoses:  1.  Facial cellulitis with associated abscess.  S/p Incision and drainage w/ recovery of purulent material, plan to DC home w/ oral augmentin and medrol dose pack as well as prn pain control.  2.  Poor dentition, ?ondontogenic source     Please see HPI and initial assessment/plan by Dr. Beltran from 12/17/17 (admission):  Mr. Maximino Bone is a 42-year-old gentleman with past medical history significant for asthma and anxiety who presents to the Emergency department today for evaluation of left facial swelling.  The patient had left dental pain 3 days ago after he chewed hard food on it, and since then he started to have pain and noticed some swelling of his face on the left side 2 days ago and went to an Urgent Care center where he was diagnosed with possible periodontal infection, possible abscess and was started on Augmentin and Norco.  Today the patient woke up with significant swelling of the face on the left side involving the left periorbital area with diffuse pain.  The patient said he grinds on his teeth  and almost does not have  part of of his teeth due to grinding.  He states he does this when he stressed.  He denied any fever or chills.  No discharge from eyes.  No sore throat, speech or swallowing difficulty.  He denied any bowel or urinary habit change.  The patient took his Augmentin as prescribed.  Despite that, the swelling got worse and he came to the Emergency Room.  I discussed with Dr. Null, ED physician, who also discussed with the on-call ENT specialist who will see the patient in the morning.     Maximino Bone is a 42-year-old gentleman  with history of asthma and anxiety, issues with his teeth, grinding his teeth with significant damage, who presented today with left facial swelling and periorbital swelling as well as pain, and being admitted to the hospital as he failed outpatient treatment, as an inpatient.   1.  Left side facial cellulitis with possible organizing maxillary abscess.   2.  History of anxiety.       PLAN:  The patient is being admitted as an inpatient.  Expect at least 2-night stay in the hospital. Started on IV fluids and IV antibiotic, Unasyn, added Clindamycin for double coverage.  Decadron was started 4 mg IV every 6 hours for today to help with inflammation. ENT will evaluate the patient in the morning.  At this point there is no significant abscess clinically, but CT scan is suggestive of some collection of fluid.  N.p.o. until patient is seen by ENT, and will evaluate the patient and decide if there is any collection to be drained.  The patient appears to be stable.  All his questions and concerns addressed, and he showed understanding.  In the event of cardiorespiratory arrest, he is full code.     Hospital Course    Mr. Bone did very well overnight and noted dramatic improvement in his left facial swelling and redness with IV antibiotics though an obvious area of fluctuance seemed to form/organize over his left maxillary area.  ENT did see him today and Dr. Lua performed and incision and drainage with recovery of thick purulent material suggesting this was successful.  Mr. 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  As such, he is being discharged on oral Augmentin, a medrol dose pack and prn pain control with plans for close follow up w/ dentistry and prn follow up with ENT clinic.       Discharge Disposition:  Discharged to home     Allergies:  No Known Allergies     Discharge Medications:   Current Discharge Medication List      START taking  "these medications    Details   acetaminophen (TYLENOL) 325 MG tablet Take 2 tablets (650 mg) by mouth every 4 hours as needed for mild pain  Qty: 100 tablet    Associated Diagnoses: Facial cellulitis      methylPREDNISolone (MEDROL DOSEPAK) 4 MG tablet Follow package instructions  Qty: 21 tablet, Refills: 0    Associated Diagnoses: Facial cellulitis         CONTINUE these medications which have NOT CHANGED    Details   HYDROcodone-acetaminophen (NORCO) 5-325 MG per tablet Take 1-2 tablets by mouth every 6 hours as needed for moderate to severe pain       amoxicillin-clavulanate (AUGMENTIN) 875-125 MG per tablet Take 1 tablet by mouth 2 times daily Starting 12/16/2017 for 10 days      ibuprofen (ADVIL/MOTRIN) 600 MG tablet Take by mouth every 6 hours as needed for moderate pain      albuterol (PROAIR HFA, PROVENTIL HFA, VENTOLIN HFA) 108 (90 BASE) MCG/ACT inhaler Inhale 2 puffs into the lungs every 2 hours as needed for shortness of breath / dyspnea  Qty: 1 Inhaler, Refills: 0              Condition on Discharge:  Discharge condition: Stable   Discharge vitals: Blood pressure 121/78, temperature 98.1  F (36.7  C), temperature source Oral, resp. rate 16, height 1.778 m (5' 10\"), weight 88.9 kg (196 lb), SpO2 95 %.   Code status on discharge: Full Code     History of Illness:  See detailed admission note for full details.    Physical Exam:  Blood pressure 121/78, temperature 98.1  F (36.7  C), temperature source Oral, resp. rate 16, height 1.778 m (5' 10\"), weight 88.9 kg (196 lb), SpO2 95 %.  Wt Readings from Last 1 Encounters:   12/17/17 88.9 kg (196 lb)     General: Alert, awake, no acute distress.  HEENT: left face mildly swollen w/ warmth overlying left maxillary area w/ area of fluctuance (seen pre-I&D).  Dentition is poor diffusely.    Cardiac: RRR, S1, S2.  No murmurs appreciated.  Pulmonary: Normal chest rise, normal work of breathing.  Lungs CTA BL  Abdomen: soft, non-tender, non-distended.  Bowel Sounds " Present.  No guarding.  Extremities: no deformities.  Warm, well perfused.  Skin: no rashes or lesions noted.  Warm and Dry.  Neuro: No focal deficits noted.  Speech clear.  Coordination and strength grossly normal.  Psych: Appropriate affect.    Procedures other than Imaging:  Incision and drainage by DR. Lua (ENT) on 12/18/17     Imaging:  Results for orders placed or performed during the hospital encounter of 12/17/17   CT Maxillofacial w Contrast    Narrative    CT OF THE FACE WITH CONTRAST 12/17/2017 5:50 PM     COMPARISON: None    HISTORY: Left upper dental pain, swelling to cheek and orbit, concern  for abscess.    TECHNIQUE:  Helical thin-section axial CT images of the face were  acquired after the administration of 80 mL Isovue-370 intravenous  contrast. Coronal reconstructions were created from the axial source  data.    FINDINGS: There is subcutaneous fat stranding throughout the left face  from the left lower eyelid, throughout the left cheek down to the left  jawline. There is a multilobulated hypodense collection adjacent to  the midline and left anterior maxilla that extends from the gingival  buccal sulcus up to the level of the midportion of the left maxillary  sinus. This collection likely represents an abscess possibly related  to injury of the mucosal space. This collection measures roughly 3.1 x  0.8 cm in the transverse and AP dimensions respectively. There is no  evidence for dental disease anywhere in the maxilla or mandible other  than mild dental caries of tooth #22 in the left mandible and teeth  #2, 5 and 7 in the right maxilla.    There is mild-moderate polypoid mucosal thickening in the maxillary  sinuses bilaterally, ethmoid air cells bilaterally and frontal sinuses  bilaterally but no definite evidence for acute sinusitis. There are no  facial bone fractures. The orbits and globes bilaterally are  unremarkable.      Impression    IMPRESSION:  1. Lobulated hypodense collection  adjacent to the left anterior aspect  of the maxilla possibly representing an abscess arising from the  mucosal space near the gingivobuccal sulcus. This does not appear to  be secondary to dental disease.  2. Cellulitis of the left face likely related to the presumed abscess.  3. Scattered mucosal thickening in the paranasal sinuses but no  definite evidence for acute sinusitis.  4. No facial bone fracture.  5. No evidence for intraorbital pathology.      Radiation dose for this scan was reduced using automated exposure  control, adjustment of the mA and/or kV according to patient size, or  iterative reconstruction technique    FLAVIA DELGADILLO MD        Consultations:  ENT.       Recent Lab Results:    Recent Labs  Lab 12/17/17  1655   WBC 10.4   HGB 14.6   HCT 41.4   MCV 90             Lab Results   Component Value Date     12/17/2017     10/01/2017     03/26/2015    Lab Results   Component Value Date    CHLORIDE 102 12/17/2017    CHLORIDE 103 10/01/2017    CHLORIDE 104 03/26/2015    Lab Results   Component Value Date    BUN 11 12/17/2017    BUN 17 10/01/2017    BUN 7 03/26/2015      Lab Results   Component Value Date    POTASSIUM 3.7 12/17/2017    POTASSIUM 4.1 10/01/2017    POTASSIUM 4.0 03/26/2015    Lab Results   Component Value Date    CO2 25 12/17/2017    CO2 27 10/01/2017    CO2 29 03/26/2015    Lab Results   Component Value Date    CR 0.81 12/17/2017    CR 1.01 10/01/2017    CR 0.81 03/26/2015               Pending Results:  Unresulted Labs Ordered in the Past 30 Days of this Admission     Date and Time Order Name Status Description    12/17/2017 1713 Blood culture Preliminary     12/17/2017 1700 Blood culture Preliminary            Discharge Instructions and Follow-Up:     Discharge Procedure Orders  Reason for your hospital stay   Order Comments: You were admitted for a facial cellulitis which was drained by our ENT surgeon.  You now will continue your oral Augmentin at home to  complete what will actually be an 11 day total course of antibiotics as well as a medrol dose pack to help with the swelling.     Follow-up and recommended labs and tests    Order Comments: Please follow up in dental clinic as recommended.  If symptoms worsen or you have wound issues please return to the ER or contact the ENT clinic as discussed.     Activity   Order Comments: Your activity upon discharge: activity as tolerated but no driving or operating machinery if you have been taking Norco or other narcotic pain medications.   Order Specific Question Answer Comments   Is discharge order? Yes      Full Code     Diet   Order Comments: Follow this diet upon discharge: soft diet until symptoms completely resolved and you have seen the dentist.   Order Specific Question Answer Comments   Is discharge order? Yes          Total time spent in face to face contact with the patient and coordinating discharge was:  45 Minutes.

## 2017-12-18 NOTE — PHARMACY-ADMISSION MEDICATION HISTORY
Admission medication history interview status for this patient is complete. See [facility] admission navigator for allergy information, prior to admission medications and immunization status.     Medication history interview source(s):Patient  Medication history resources (including written lists, pill bottles, clinic record):None  [facility]    Changes made to PTA medication list:  Added: ibuprofen  Deleted: doxy, ativan,   Changed: augmentin to 875/125 mg 1 tab BID    Actions taken by pharmacist (provider contacted, etc):None     Additional medication history information:None    Medication reconciliation/reorder completed by provider prior to medication history? No    Do you take OTC medications (eg tylenol, ibuprofen, fish oil, eye/ear drops, etc)? yes(Y/N)    For patients on insulin therapy: no (Y/N)  Lantus/levemir/NPH/Mix 70/30 dose:   (Y/N) (see Med list for doses)   Sliding scale Novolog Y/N  If Yes, do you have a baseline novolog pre-meal dose:  units with meals  Patients eat three meals a day:   Y/N    How many episodes of hypoglycemia do you have per week: _______  How many missed doses do you have per week: ______  How many times do you check your blood glucose per day: _______   Any Barriers to therapy - Be specific :  cost of medications, comfortable with giving injections (if applicable), comfortable and confident with current diabetes regimen: Y/N ______________      Prior to Admission medications    Medication Sig Last Dose Taking? Auth Provider   HYDROcodone-acetaminophen (NORCO) 5-325 MG per tablet Take 1-2 tablets by mouth every 6 hours as needed for moderate to severe pain  12/17/2017 at 1500 Yes Reported, Patient   amoxicillin-clavulanate (AUGMENTIN) 875-125 MG per tablet Take 1 tablet by mouth 2 times daily Starting 12/16/2017 for 10 days 12/17/2017 at x1 Yes Unknown, Entered By History   ibuprofen (ADVIL/MOTRIN) 600 MG tablet Take by mouth every 6 hours as needed for moderate pain  12/16/2017 at Unknown time Yes Unknown, Entered By History   albuterol (PROAIR HFA, PROVENTIL HFA, VENTOLIN HFA) 108 (90 BASE) MCG/ACT inhaler Inhale 2 puffs into the lungs every 2 hours as needed for shortness of breath / dyspnea   Gregg Matthews MD

## 2017-12-18 NOTE — CONSULTS
D) ELDER responding to MD consult. Met with Maximino who lives in [place] x 14 years. With his children April 18 and Kee 12. April is a senior at [employer] and has an IEP, she does not drive or work. Kee attends [employer] in [place] for his diagnosis of autism. A bus picks him up and drops him off at their home. Per Maximino, Kee is emotionally like a 7 yr old. Maximino reports that both children have social workers and help through their schools. Maximino reports that his wife (mother of the children) left 2 years ago and he has had no contact from her or any of her family. Maximino's family lives in [place] and [place]. 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 These hours work well for him and his children. He is anxious to return to work.  Maximino reports he drove to the hospital and plans to drive himself home.  I) SW discussed SW role and offered supportive listening and encouragement. SW discussed supports for pt and his children. SW helped problem solve possible need for transport home today. He would contact a neighbor.  A) Maximino is A&O with somewhat flat affect and appropriate eye contact with poor dentition. He has limited insight into his own anxiety and discussed his daughters anxiety at length but referred to it has her choice. His limited support system are a few neighbors. He does not identify co-workers, friends or family as supports at all. He is confident that he can provide the medical care he will need at home.   P) SW available for any transportation needs upon d/c.

## 2017-12-18 NOTE — ED NOTES
"[facility]  ED Nurse Handoff Report    Maximino Bone is a 42 year old male   ED Chief complaint: Facial Swelling  . ED Diagnosis:   Final diagnoses:   Facial cellulitis     Allergies: No Known Allergies    Code Status: Full Code  Activity level - Baseline/Home:  Independent. Activity Level - Current:   Independent. Lift room needed: No. Bariatric: No   Needed: No   Isolation: No. Infection: Not Applicable.     Vital Signs:   Vitals:    12/17/17 1630 12/17/17 1730 12/17/17 1945   BP: (!) 146/106 (!) 136/101 125/84   Temp: 98.4  F (36.9  C)     SpO2: 95% 96% 97%   Weight: 95.3 kg (210 lb)     Height: 1.778 m (5' 10\")         Cardiac Rhythm:  ,      Pain level: 0-10 Pain Scale: 10  Patient confused: No. Patient Falls Risk: No.   Elimination Status: Has voided   Patient Report - Initial Complaint: facial swelling and pain, dental pain. Focused Assessment: HEENT - Left Eye Vision Change: other (see comments) (left eye is shut from swelling, unable to open without assistance. ) Teeth WDL: teeth symptoms Teeth Symptoms: decay/caries; tooth/teeth broken (painful swelling to left mouth and side of face)  Tests Performed:   CT Maxillofacial w Contrast   Preliminary Result   IMPRESSION:   1. Lobulated hypodense collection adjacent to the left anterior aspect   of the maxilla possibly representing an abscess arising from the   mucosal space near the gingivobuccal sulcus. This does not appear to   be secondary to dental disease.   2. Cellulitis of the left face likely related to the presumed abscess.   3. Scattered mucosal thickening in the paranasal sinuses but no   definite evidence for acute sinusitis.   4. No facial bone fracture.   5. No evidence for intraorbital pathology.         Radiation dose for this scan was reduced using automated exposure   control, adjustment of the mA and/or kV according to patient size, or   iterative reconstruction technique        . Abnormal Results:   Labs Ordered " and Resulted from Time of ED Arrival Up to the Time of Departure from the ED   CBC WITH PLATELETS DIFFERENTIAL - Abnormal; Notable for the following:        Result Value    Absolute Monocytes 1.4 (*)     All other components within normal limits   COMPREHENSIVE METABOLIC PANEL - Abnormal; Notable for the following:     Glucose 104 (*)     Calcium 8.3 (*)     All other components within normal limits   LACTIC ACID WHOLE BLOOD   PERIPHERAL IV CATHETER   BLOOD CULTURE   BLOOD CULTURE     Treatments provided: iv unasyn, decadron, toradol, ns bolus  Family Comments: has 17yo and 11 yo at home. Has plan for them to stay home tomorrow and will keep in touch.   OBS brochure/video discussed/provided to patient:  No  ED Medications:   Medications   ketorolac (TORADOL) injection 30 mg (30 mg Intravenous Given 12/17/17 1718)   0.9% sodium chloride BOLUS (0 mLs Intravenous Stopped 12/17/17 1748)   iopamidol (ISOVUE-370) solution 500 mL (80 mLs Intravenous Given 12/17/17 1742)   ampicillin-sulbactam (UNASYN) 3 g vial to attach to  mL bag (0 g Intravenous Stopped 12/17/17 2056)   dexamethasone (DECADRON) injection 6 mg (6 mg Intravenous Given 12/17/17 1938)     Drips infusing:  No  For the majority of the shift, the patient's behavior Green. Interventions performed were .     Severe Sepsis OR Septic Shock Diagnosis Present: No      ED Nurse Name/Phone Number: Latosha Julian,   9:00 PM    RECEIVING UNIT ED HANDOFF REVIEW    Above ED Nurse Handoff Report was reviewed: Yes  Reviewed by: ELBA VILLASENOR on December 17, 2017 at 9:07 PM

## 2017-12-23 LAB
BACTERIA SPEC CULT: NO GROWTH
BACTERIA SPEC CULT: NO GROWTH
Lab: NORMAL
Lab: NORMAL
SPECIMEN SOURCE: NORMAL
SPECIMEN SOURCE: NORMAL

## 2019-10-17 ENCOUNTER — NURSE TRIAGE (OUTPATIENT)
Dept: NURSING | Facility: CLINIC | Age: 44
End: 2019-10-17

## 2019-10-17 NOTE — TELEPHONE ENCOUNTER
Maximino works in the health field.  Maximino is needing a MD's note for time he missed on the 4th and 5th.  Maximino takes blood pressure medication.  FNA advised to be seen by MD and Maximino agreed.

## 2019-12-15 ENCOUNTER — HEALTH MAINTENANCE LETTER (OUTPATIENT)
Age: 44
End: 2019-12-15

## 2020-09-28 ENCOUNTER — HOSPITAL ENCOUNTER (EMERGENCY)
Facility: CLINIC | Age: 45
Discharge: HOME | End: 2020-09-29
Attending: EMERGENCY MEDICINE | Admitting: EMERGENCY MEDICINE
Payer: COMMERCIAL

## 2020-09-28 DIAGNOSIS — R10.32 ABDOMINAL PAIN, LEFT LOWER QUADRANT: ICD-10-CM

## 2020-09-28 DIAGNOSIS — R11.2 NON-INTRACTABLE VOMITING WITH NAUSEA, UNSPECIFIED VOMITING TYPE: ICD-10-CM

## 2020-09-28 DIAGNOSIS — R49.0 HOARSENESS OF VOICE: ICD-10-CM

## 2020-09-28 PROCEDURE — 96374 THER/PROPH/DIAG INJ IV PUSH: CPT | Mod: 59

## 2020-09-28 PROCEDURE — 96376 TX/PRO/DX INJ SAME DRUG ADON: CPT

## 2020-09-28 PROCEDURE — C9803 HOPD COVID-19 SPEC COLLECT: HCPCS

## 2020-09-28 PROCEDURE — 99285 EMERGENCY DEPT VISIT HI MDM: CPT | Mod: 25

## 2020-09-28 PROCEDURE — 96375 TX/PRO/DX INJ NEW DRUG ADDON: CPT

## 2020-09-28 PROCEDURE — 96361 HYDRATE IV INFUSION ADD-ON: CPT

## 2020-09-28 NOTE — LETTER
September 29, 2020      To Whom It May Concern:      Maximino Bone was seen in our Emergency Department today, 09/29/20.  I expect his condition to improve over the next 3 days.  He may return to work when improved.        Sincerely,        Michael Nassar RN

## 2020-09-28 NOTE — ED AVS SNAPSHOT
[facility] Emergency Department  [address]  [facility] [address]  Phone:  [phone]  Fax:  [phone]                                    Maximino Bone   MRN: [id number]    Department:  [facility] Emergency Department   Date of Visit:  9/28/2020           After Visit Summary Signature Page    I have received my discharge instructions, and my questions have been answered. I have discussed any challenges I see with this plan with the nurse or doctor.    ..........................................................................................................................................  Patient/Patient Representative Signature      ..........................................................................................................................................  Patient Representative Print Name and Relationship to Patient    ..................................................               ................................................  Date                                   Time    ..........................................................................................................................................  Reviewed by Signature/Title    ...................................................              ..............................................  Date                                               Time          22EPIC Rev 08/18

## 2020-09-29 ENCOUNTER — APPOINTMENT (OUTPATIENT)
Dept: CT IMAGING | Facility: CLINIC | Age: 45
End: 2020-09-29
Attending: EMERGENCY MEDICINE
Payer: COMMERCIAL

## 2020-09-29 VITALS
SYSTOLIC BLOOD PRESSURE: 156 MMHG | OXYGEN SATURATION: 96 % | RESPIRATION RATE: 18 BRPM | DIASTOLIC BLOOD PRESSURE: 103 MMHG | TEMPERATURE: 98.3 F | HEART RATE: 93 BPM

## 2020-09-29 LAB
ALBUMIN SERPL-MCNC: 3.5 G/DL (ref 3.4–5)
ALP SERPL-CCNC: 128 U/L (ref 40–150)
ALT SERPL W P-5'-P-CCNC: 53 U/L (ref 0–70)
ANION GAP SERPL CALCULATED.3IONS-SCNC: 5 MMOL/L (ref 3–14)
AST SERPL W P-5'-P-CCNC: 31 U/L (ref 0–45)
BASOPHILS # BLD AUTO: 0 10E9/L (ref 0–0.2)
BASOPHILS NFR BLD AUTO: 0.5 %
BILIRUB SERPL-MCNC: 0.5 MG/DL (ref 0.2–1.3)
BUN SERPL-MCNC: 13 MG/DL (ref 7–30)
CALCIUM SERPL-MCNC: 8.5 MG/DL (ref 8.5–10.1)
CHLORIDE SERPL-SCNC: 108 MMOL/L (ref 94–109)
CO2 SERPL-SCNC: 27 MMOL/L (ref 20–32)
CREAT SERPL-MCNC: 0.85 MG/DL (ref 0.66–1.25)
DEPRECATED S PYO AG THROAT QL EIA: NEGATIVE
DIFFERENTIAL METHOD BLD: NORMAL
EOSINOPHIL # BLD AUTO: 0.4 10E9/L (ref 0–0.7)
EOSINOPHIL NFR BLD AUTO: 6.8 %
ERYTHROCYTE [DISTWIDTH] IN BLOOD BY AUTOMATED COUNT: 13.1 % (ref 10–15)
GFR SERPL CREATININE-BSD FRML MDRD: >90 ML/MIN/{1.73_M2}
GLUCOSE SERPL-MCNC: 133 MG/DL (ref 70–99)
HCT VFR BLD AUTO: 43.5 % (ref 40–53)
HGB BLD-MCNC: 14.5 G/DL (ref 13.3–17.7)
IMM GRANULOCYTES # BLD: 0 10E9/L (ref 0–0.4)
IMM GRANULOCYTES NFR BLD: 0.2 %
LACTATE BLD-SCNC: 2.1 MMOL/L (ref 0.7–2)
LYMPHOCYTES # BLD AUTO: 2.1 10E9/L (ref 0.8–5.3)
LYMPHOCYTES NFR BLD AUTO: 34.4 %
MCH RBC QN AUTO: 30.7 PG (ref 26.5–33)
MCHC RBC AUTO-ENTMCNC: 33.3 G/DL (ref 31.5–36.5)
MCV RBC AUTO: 92 FL (ref 78–100)
MONOCYTES # BLD AUTO: 0.6 10E9/L (ref 0–1.3)
MONOCYTES NFR BLD AUTO: 10.2 %
NEUTROPHILS # BLD AUTO: 3 10E9/L (ref 1.6–8.3)
NEUTROPHILS NFR BLD AUTO: 47.9 %
NRBC # BLD AUTO: 0 10*3/UL
NRBC BLD AUTO-RTO: 0 /100
PLATELET # BLD AUTO: 180 10E9/L (ref 150–450)
POTASSIUM SERPL-SCNC: 3.5 MMOL/L (ref 3.4–5.3)
PROT SERPL-MCNC: 7.3 G/DL (ref 6.8–8.8)
RBC # BLD AUTO: 4.73 10E12/L (ref 4.4–5.9)
SARS-COV-2 RNA SPEC QL NAA+PROBE: NOT DETECTED
SODIUM SERPL-SCNC: 140 MMOL/L (ref 133–144)
SPECIMEN SOURCE: NORMAL
STREP GROUP A PCR: NOT DETECTED
WBC # BLD AUTO: 6.2 10E9/L (ref 4–11)

## 2020-09-29 PROCEDURE — 25000128 H RX IP 250 OP 636: Performed by: EMERGENCY MEDICINE

## 2020-09-29 PROCEDURE — 74177 CT ABD & PELVIS W/CONTRAST: CPT

## 2020-09-29 PROCEDURE — 25800030 ZZH RX IP 258 OP 636: Performed by: EMERGENCY MEDICINE

## 2020-09-29 PROCEDURE — 25000125 ZZHC RX 250: Performed by: EMERGENCY MEDICINE

## 2020-09-29 PROCEDURE — 87651 STREP A DNA AMP PROBE: CPT | Performed by: EMERGENCY MEDICINE

## 2020-09-29 PROCEDURE — C9113 INJ PANTOPRAZOLE SODIUM, VIA: HCPCS | Performed by: EMERGENCY MEDICINE

## 2020-09-29 PROCEDURE — 80053 COMPREHEN METABOLIC PANEL: CPT | Performed by: EMERGENCY MEDICINE

## 2020-09-29 PROCEDURE — 85025 COMPLETE CBC W/AUTO DIFF WBC: CPT | Performed by: EMERGENCY MEDICINE

## 2020-09-29 PROCEDURE — U0003 INFECTIOUS AGENT DETECTION BY NUCLEIC ACID (DNA OR RNA); SEVERE ACUTE RESPIRATORY SYNDROME CORONAVIRUS 2 (SARS-COV-2) (CORONAVIRUS DISEASE [COVID-19]), AMPLIFIED PROBE TECHNIQUE, MAKING USE OF HIGH THROUGHPUT TECHNOLOGIES AS DESCRIBED BY CMS-2020-01-R: HCPCS | Performed by: EMERGENCY MEDICINE

## 2020-09-29 PROCEDURE — 40001204 ZZHCL STATISTIC STREP A RAPID: Performed by: EMERGENCY MEDICINE

## 2020-09-29 PROCEDURE — 83605 ASSAY OF LACTIC ACID: CPT | Performed by: EMERGENCY MEDICINE

## 2020-09-29 RX ORDER — HYDROMORPHONE HYDROCHLORIDE 1 MG/ML
0.5 INJECTION, SOLUTION INTRAMUSCULAR; INTRAVENOUS; SUBCUTANEOUS
Status: DISCONTINUED | OUTPATIENT
Start: 2020-09-29 | End: 2020-09-29 | Stop reason: HOSPADM

## 2020-09-29 RX ORDER — IOPAMIDOL 755 MG/ML
500 INJECTION, SOLUTION INTRAVASCULAR ONCE
Status: COMPLETED | OUTPATIENT
Start: 2020-09-29 | End: 2020-09-29

## 2020-09-29 RX ORDER — ONDANSETRON 2 MG/ML
4 INJECTION INTRAMUSCULAR; INTRAVENOUS EVERY 30 MIN PRN
Status: DISCONTINUED | OUTPATIENT
Start: 2020-09-29 | End: 2020-09-29 | Stop reason: HOSPADM

## 2020-09-29 RX ORDER — ONDANSETRON 4 MG/1
4 TABLET, ORALLY DISINTEGRATING ORAL EVERY 8 HOURS PRN
Qty: 10 TABLET | Refills: 0 | Status: SHIPPED | OUTPATIENT
Start: 2020-09-29 | End: 2020-10-02

## 2020-09-29 RX ADMIN — PANTOPRAZOLE SODIUM 40 MG: 40 INJECTION, POWDER, FOR SOLUTION INTRAVENOUS at 00:36

## 2020-09-29 RX ADMIN — ONDANSETRON 4 MG: 2 INJECTION INTRAMUSCULAR; INTRAVENOUS at 02:30

## 2020-09-29 RX ADMIN — IOPAMIDOL 100 ML: 755 INJECTION, SOLUTION INTRAVENOUS at 01:50

## 2020-09-29 RX ADMIN — ONDANSETRON 4 MG: 2 INJECTION INTRAMUSCULAR; INTRAVENOUS at 00:36

## 2020-09-29 RX ADMIN — SODIUM CHLORIDE 65 ML: 9 INJECTION, SOLUTION INTRAVENOUS at 01:50

## 2020-09-29 RX ADMIN — SODIUM CHLORIDE 1000 ML: 9 INJECTION, SOLUTION INTRAVENOUS at 00:37

## 2020-09-29 RX ADMIN — HYDROMORPHONE HYDROCHLORIDE 0.5 MG: 1 INJECTION, SOLUTION INTRAMUSCULAR; INTRAVENOUS; SUBCUTANEOUS at 00:37

## 2020-09-29 RX ADMIN — HYDROMORPHONE HYDROCHLORIDE 0.5 MG: 1 INJECTION, SOLUTION INTRAMUSCULAR; INTRAVENOUS; SUBCUTANEOUS at 02:31

## 2020-09-29 ASSESSMENT — ENCOUNTER SYMPTOMS
FATIGUE: 1
MYALGIAS: 1
SORE THROAT: 1
COUGH: 0
SHORTNESS OF BREATH: 0
DIARRHEA: 1
DYSURIA: 0
ABDOMINAL PAIN: 1
NAUSEA: 1
VOMITING: 1
FEVER: 0
BACK PAIN: 0
CHILLS: 1

## 2020-09-29 NOTE — RESULT ENCOUNTER NOTE
Group A Streptococcus PCR is NEGATIVE   No treatment or change in treatment [facility] ED lab result protocol - Strep protocol.

## 2020-09-29 NOTE — DISCHARGE INSTRUCTIONS
Discharge Instructions  COVID-19    COVID-19 is the disease caused by a new coronavirus. The virus spreads from person to person primarily by droplets when an infected person coughs or sneezes and the droplet either lands on another person or that other person touches a surface with the droplet on it. Diagnosis of COVID-19 can be made with a test but many times the test is not immediately available or not necessary. There is no specific treatment or medicine for the disease.    You may have been diagnosed with COVID, may be being tested for COVID and have a pending test result, or may have been exposed to COVID.    Symptoms of COVID-19  Many people have no symptoms or mild symptoms.  Symptoms may usually appear 4 to 5 days (up to 14 days) after contact with another ill person. Some people will get severe symptoms and pneumonia. Usual symptoms are:     ? Fever  ? Cough  ? Trouble breathing  ? Less common symptoms are: Headache, body aches, sore throat,      sneezing, diarrhea, loss of taste or smell.    Stay home  Most people will recover from COVID illness with mild symptoms.  Isolation by staying home is the best method to prevent the spread of the illness. Do not go to work or school. Have a friend or relative do your shopping. Do not use public transportation (bus, train) or ridesharing ([employer], Uber).    If you have symptoms of COVID, you should stay home for at least 10 days, and for 24 hours with no fever and improvement of symptoms--whichever is longer. (Your fever should be gone for 24 hours without using fever-reducing medicine)    For example, if you have a fever and cough for 6 days, you need to stay home 4 more days with no fever for a total of 10 days. Or, if you have a fever and cough for 10 days, you need to stay home one more day with no fever for a total of 11 days.    If you have an exposure to COVID (you spent 15 minutes or more within six feet of somebody who has COVID), you should stay home for 14  days.    If you are being tested for COVID and your test is pending, you should stay home until you know your test result.    How should I protect myself and others?    Separate yourself from other people in your home.?As much as possible, you should stay in one room and away from other people in your home. Also, use a separate bathroom, if possible. Avoid handling pets or other animals while sick.     Wear a facemask if you need to be around other people and cover your mouth and nose with a tissue when you cough or sneeze.     Avoid sharing personal household items. You should not share dishes, drinking glasses, forks/knives/spoons, towels, or bedding with other people in your home. After using these items, they should be washed with soap and water. Clean parts of your home that are touched often (doorknobs, faucets, countertops, etc.) daily.     Wash your hands often with soap and water for at least 20 seconds or use an alcohol-based hand  containing at least 60% alcohol.     Avoid touching your face.    Treat your symptoms. You can take Acetaminophen (Tylenol) to treat body aches and fever as needed for comfort. Ibuprofen (Advil or Motrin) can be used as well if you still have symptoms after taking Tylenol. Drink fluids. Rest.    Watch for worsening symptoms such as shortness of breath/difficulty breathing or very severe weakness.    Employers/workplaces are being asked by the Centers for Disease Control (CDC) to not request notes/documentation for you to return to work or prove that you were ill. You may choose to show your employer this paperwork. Also, repeat testing should not be required to return to work.    Return to the Emergency Department if:    If you are developing worsening breathing, shortness of breath, or feel worse you should seek medical attention.  If you are uncertain, contact your health care provider/clinic. If you need emergency medical attention, call 911 and tell them you have  been ill.

## 2020-09-29 NOTE — ED PROVIDER NOTES
History     Chief Complaint:  Nausea, Vomiting, & Diarrhea      HPI   Maximino Bone is a 45 year old male past medical history of diverticulitis, hyperlipidemia and asthma who presents with nausea, vomiting, diarrhea, hoarse voice and abdominal pain.  Patient reports symptoms began on Friday with multiple episodes of vomiting.  He notes after multiple episodes of vomiting he had a significant sore throat and eventually hoarse voice.  He notes some streaking of blood in his emesis after multiple bouts of vomiting.  He does note he noted blood in his stool on one occasion on Friday but this has since resolved.  In regards to his abdominal pain, he reports this started over similar timeframe.  He locates it in his left lower quadrant.  No pain in his back or testicles.  Patient also notes body aches and chills.    Allergies:  No known drug allergies.    Medications:    The patient is not currently taking any prescribed medications.    Past Medical History:    Hyperlipidemia  Asthma    Past Surgical History:    The patient does not have any pertinent past surgical history.    Family History:    Mother: Diabetes, hypertension  Son: Autism  Daughter: ADD    Social History:  Smoking Status: Never Smoker  Smokeless Tobacco: Current User, Chew  Alcohol Use: Positive, socially  Drug Use: Negative  PCP: Clinic, [facility] [place]  Marital Status:  Single    Review of Systems   Constitutional: Positive for chills and fatigue. Negative for fever.   HENT: Positive for sore throat.    Respiratory: Negative for cough and shortness of breath.    Cardiovascular: Negative for chest pain.   Gastrointestinal: Positive for abdominal pain (llq), diarrhea, nausea and vomiting.   Endocrine: Negative for polyuria.   Genitourinary: Negative for dysuria.   Musculoskeletal: Positive for myalgias. Negative for back pain.   All other systems reviewed and are negative.    Physical Exam     Patient Vitals for the past 24 hrs:   BP Temp  Pulse Resp SpO2   09/29/20 0235 -- -- 97 -- 95 %   09/29/20 0230 (!) 156/103 -- -- -- --   09/29/20 0145 -- -- -- -- 95 %   09/29/20 0100 (!) 160/105 -- 98 -- 96 %   09/29/20 0045 -- -- 100 18 98 %   09/29/20 0030 (!) 157/113 -- -- -- --   09/28/20 2342 (!) 166/113 98.3  F (36.8  C) 114 16 98 %       Physical Exam  General: Patient is awake, alert and interactive when I enter the room. Appears uncomfortable   Head: The scalp, face, and head appear normal  Eyes: The pupils are equal, round, and reactive to light. Conjunctivae and sclerae are normal  ENT: External acoustic canals are normal. Erythematous posterior oropharynx. Uvula is in the midline. Hoarse voice   Neck: Normal range of motion. No anterior cervical lymphadenopathy noted  CV: tachycardiac. S1/S2. No murmurs.   Resp: Lungs are clear without wheezes or rales. No respiratory distress.   GI: Abdomen is soft, no rigidity. No evidence of pulsatile mass. No fluid waves or evidence of ascites. No distension. He is tender to palpation in the LL quadrant. There is no rebound or guarding. No hernias or bruising are noted in detailed exam. No CVA tenderness.     MS: Normal tone. Joints grossly normal without effusions. No asymmetric leg swelling, calf or thigh tenderness.    Skin: No rash or lesions noted. Normal capillary refill noted  Neuro: Speech is normal and fluent. Face is symmetric. Moving all extremities.   Psych:  Normal affect.  Appropriate interactions.    Emergency Department Course     Imaging:  Radiology findings were communicated with the patient who voiced understanding of the findings.    CT Abdomen Pelvis w Contrast  1.  Colonic diverticulosis without convincing evidence of acute diverticulitis.  2.  Mild, diffuse hepatic steatosis.  3.  Mildly prominent lower paraesophageal lymph node may reflect sequela of inflammatory change from recent vomiting, though consider follow-up with gastroenterology for consideration of endoscopy.  Reading per  radiology.    Laboratory:  Laboratory findings were communicated with the patient who voiced understanding of the findings.    CBC: WBC 6.2, HGB 14.5,   CMP: Glucose 133(H) o/w WNL (Creatinine 0.85)  Lactic Acid (Resulted 0056): 2.1(H)    Rapid Strep Test: Negative  Strep Culture: Pending  Symptomatic COVID-19 by PCR: In process    Interventions:  0036: Zofran, 4 mg, IV  0036: Protonix, 40 mg, IV  0037: Normal Saline, 1 liter, IV bolus   0037: Dilaudid, 0.5 mg, IV  0230: Zofran, 4mg, IV  0231: Dilaudid, 0.5 mg, IV    Emergency Department Course:    ED Course as of Sep 29 0245   Mon Sep 28, 2020   2355 Nursing notes and vitals reviewed.      2357 I performed a physical exam of the patient as documented above.      Tue Sep 29, 2020   0015 Patient swabbed for COVID-19 testing.      0035 IV was inserted and blood was drawn for laboratory testing, results above. The patient's throat was swabbed and this sample was sent for rapid strep screen, findings above.        0145 The patient was sent for a CT while in the emergency department, results above.          Findings and plan explained to the patient. Patient discharged home with instructions regarding supportive care, medications, and reasons to return. The importance of close follow-up was reviewed. The patient was prescribed Zofran.    Impression & Plan      Medical Decision Making:  Maximino Bone is a 45 year old male who presents with abdominal pain, nausea and vomiting.  Patient locates his pain in the left lower quadrant and relates it to previous experience with diverticulitis.  A broad differential diagnosis was of course considered. The workup in the ED is at this point negative.  CT of the abdomen and pelvis does not reveal any signs of diverticulitis or additional surgical pathology.  There was some evidence of enlarged lymph nodes around his esophagus which I discussed with the patient. These are likely reactive from vomiting but he will require  gastroenterology follow-up to ensure that they are indeed reactive and not cancerous in nature.  No etiology for the patients pain is found at this point and my suspicion of an intraabdominal catastrophe or other worrisome etiology is very low. I will not therefore admit him for serial exams and further workup.  This seems most likely to be a viral process.  COVID-19 was considered and the patient was swabbed and kept on precautions.  Patient felt better patient is hemodynamically stable in ED. Plan is home with abdominal pain recheck by primary care physician or return to ED at anytime.  Return for fevers greater than 102, increasing pain, other new symptoms develop.  Abdominal pain handout given.  Questions were answered.      Covid-19  Maximino Bone was evaluated during a global COVID-19 pandemic, which necessitated consideration that the patient might be at risk for infection with the SARS-CoV-2 virus that causes COVID-19.   Applicable protocols for evaluation were followed during the patient's care.   COVID-19 was considered as part of the patient's evaluation. The plan for testing is:  a test was obtained during this visit.    Diagnosis:    ICD-10-CM    1. Abdominal pain, left lower quadrant  R10.32    2. Non-intractable vomiting with nausea, unspecified vomiting type  R11.2    3. Hoarseness of voice  R49.0      Disposition:   Patient was discharged home.    Discharge Medications:  New Prescriptions    ONDANSETRON (ZOFRAN ODT) 4 MG ODT TAB    Take 1 tablet (4 mg) by mouth every 8 hours as needed for nausea       Scribe Disclosure:  IKee, am serving as a scribe at 12:00 AM on 9/29/2020 to document services personally performed by Gordy Garcia MD based on my observations and the provider's statements to me.    [facility] EMERGENCY DEPARTMENT       Gordy Garcia MD  09/29/20 [id number]

## 2021-01-15 ENCOUNTER — HEALTH MAINTENANCE LETTER (OUTPATIENT)
Age: 46
End: 2021-01-15

## 2021-09-04 ENCOUNTER — HEALTH MAINTENANCE LETTER (OUTPATIENT)
Age: 46
End: 2021-09-04

## 2022-01-03 ENCOUNTER — HOSPITAL ENCOUNTER (EMERGENCY)
Facility: CLINIC | Age: 47
Discharge: HOME | End: 2022-01-03
Attending: EMERGENCY MEDICINE | Admitting: EMERGENCY MEDICINE
Payer: COMMERCIAL

## 2022-01-03 VITALS
RESPIRATION RATE: 20 BRPM | HEART RATE: 87 BPM | DIASTOLIC BLOOD PRESSURE: 98 MMHG | SYSTOLIC BLOOD PRESSURE: 136 MMHG | OXYGEN SATURATION: 98 % | TEMPERATURE: 99.3 F

## 2022-01-03 DIAGNOSIS — J45.21 MILD INTERMITTENT ASTHMA WITH EXACERBATION: ICD-10-CM

## 2022-01-03 LAB
ANION GAP SERPL CALCULATED.3IONS-SCNC: 6 MMOL/L (ref 3–14)
ATRIAL RATE - MUSE: 103 BPM
BASOPHILS # BLD AUTO: 0 10E3/UL (ref 0–0.2)
BASOPHILS NFR BLD AUTO: 0 %
BUN SERPL-MCNC: 10 MG/DL (ref 7–30)
CALCIUM SERPL-MCNC: 8.6 MG/DL (ref 8.5–10.1)
CHLORIDE BLD-SCNC: 108 MMOL/L (ref 94–109)
CO2 SERPL-SCNC: 24 MMOL/L (ref 20–32)
CREAT SERPL-MCNC: 0.75 MG/DL (ref 0.66–1.25)
DIASTOLIC BLOOD PRESSURE - MUSE: NORMAL MMHG
EOSINOPHIL # BLD AUTO: 0.4 10E3/UL (ref 0–0.7)
EOSINOPHIL NFR BLD AUTO: 4 %
ERYTHROCYTE [DISTWIDTH] IN BLOOD BY AUTOMATED COUNT: 12.8 % (ref 10–15)
FLUAV RNA SPEC QL NAA+PROBE: NEGATIVE
FLUBV RNA RESP QL NAA+PROBE: NEGATIVE
GFR SERPL CREATININE-BSD FRML MDRD: >90 ML/MIN/1.73M2
GLUCOSE BLD-MCNC: 106 MG/DL (ref 70–99)
HCT VFR BLD AUTO: 45.4 % (ref 40–53)
HGB BLD-MCNC: 15.5 G/DL (ref 13.3–17.7)
IMM GRANULOCYTES # BLD: 0 10E3/UL
IMM GRANULOCYTES NFR BLD: 0 %
INTERPRETATION ECG - MUSE: NORMAL
LYMPHOCYTES # BLD AUTO: 1.4 10E3/UL (ref 0.8–5.3)
LYMPHOCYTES NFR BLD AUTO: 15 %
MCH RBC QN AUTO: 30.8 PG (ref 26.5–33)
MCHC RBC AUTO-ENTMCNC: 34.1 G/DL (ref 31.5–36.5)
MCV RBC AUTO: 90 FL (ref 78–100)
MONOCYTES # BLD AUTO: 1 10E3/UL (ref 0–1.3)
MONOCYTES NFR BLD AUTO: 11 %
NEUTROPHILS # BLD AUTO: 6.5 10E3/UL (ref 1.6–8.3)
NEUTROPHILS NFR BLD AUTO: 70 %
NRBC # BLD AUTO: 0 10E3/UL
NRBC BLD AUTO-RTO: 0 /100
P AXIS - MUSE: 67 DEGREES
PLATELET # BLD AUTO: 259 10E3/UL (ref 150–450)
POTASSIUM BLD-SCNC: 4 MMOL/L (ref 3.4–5.3)
PR INTERVAL - MUSE: 154 MS
QRS DURATION - MUSE: 94 MS
QT - MUSE: 340 MS
QTC - MUSE: 445 MS
R AXIS - MUSE: 25 DEGREES
RBC # BLD AUTO: 5.04 10E6/UL (ref 4.4–5.9)
SARS-COV-2 RNA RESP QL NAA+PROBE: NEGATIVE
SODIUM SERPL-SCNC: 138 MMOL/L (ref 133–144)
SYSTOLIC BLOOD PRESSURE - MUSE: NORMAL MMHG
T AXIS - MUSE: 52 DEGREES
VENTRICULAR RATE- MUSE: 103 BPM
WBC # BLD AUTO: 9.4 10E3/UL (ref 4–11)

## 2022-01-03 PROCEDURE — 85025 COMPLETE CBC W/AUTO DIFF WBC: CPT | Performed by: EMERGENCY MEDICINE

## 2022-01-03 PROCEDURE — 93005 ELECTROCARDIOGRAM TRACING: CPT

## 2022-01-03 PROCEDURE — 94640 AIRWAY INHALATION TREATMENT: CPT

## 2022-01-03 PROCEDURE — 258N000003 HC RX IP 258 OP 636: Performed by: EMERGENCY MEDICINE

## 2022-01-03 PROCEDURE — 36415 COLL VENOUS BLD VENIPUNCTURE: CPT | Performed by: EMERGENCY MEDICINE

## 2022-01-03 PROCEDURE — 250N000013 HC RX MED GY IP 250 OP 250 PS 637: Performed by: EMERGENCY MEDICINE

## 2022-01-03 PROCEDURE — 99284 EMERGENCY DEPT VISIT MOD MDM: CPT | Mod: 25

## 2022-01-03 PROCEDURE — 250N000012 HC RX MED GY IP 250 OP 636 PS 637: Performed by: EMERGENCY MEDICINE

## 2022-01-03 PROCEDURE — 82435 ASSAY OF BLOOD CHLORIDE: CPT | Performed by: EMERGENCY MEDICINE

## 2022-01-03 PROCEDURE — C9803 HOPD COVID-19 SPEC COLLECT: HCPCS

## 2022-01-03 PROCEDURE — 87636 SARSCOV2 & INF A&B AMP PRB: CPT | Performed by: EMERGENCY MEDICINE

## 2022-01-03 PROCEDURE — 96360 HYDRATION IV INFUSION INIT: CPT

## 2022-01-03 RX ORDER — ALBUTEROL SULFATE 90 UG/1
2 AEROSOL, METERED RESPIRATORY (INHALATION) EVERY 4 HOURS PRN
Qty: 8 G | Refills: 0 | Status: SHIPPED | OUTPATIENT
Start: 2022-01-03

## 2022-01-03 RX ORDER — PREDNISONE 20 MG/1
40 TABLET ORAL DAILY
Qty: 8 TABLET | Refills: 0 | Status: SHIPPED | OUTPATIENT
Start: 2022-01-04 | End: 2022-01-08

## 2022-01-03 RX ORDER — PREDNISONE 20 MG/1
60 TABLET ORAL ONCE
Status: COMPLETED | OUTPATIENT
Start: 2022-01-03 | End: 2022-01-03

## 2022-01-03 RX ORDER — ALBUTEROL SULFATE 90 UG/1
4 AEROSOL, METERED RESPIRATORY (INHALATION) ONCE
Status: COMPLETED | OUTPATIENT
Start: 2022-01-03 | End: 2022-01-03

## 2022-01-03 RX ORDER — ALBUTEROL SULFATE 90 UG/1
2 AEROSOL, METERED RESPIRATORY (INHALATION) ONCE
Status: COMPLETED | OUTPATIENT
Start: 2022-01-03 | End: 2022-01-03

## 2022-01-03 RX ADMIN — PREDNISONE 60 MG: 20 TABLET ORAL at 08:52

## 2022-01-03 RX ADMIN — ALBUTEROL SULFATE 4 PUFF: 90 AEROSOL, METERED RESPIRATORY (INHALATION) at 08:48

## 2022-01-03 RX ADMIN — SODIUM CHLORIDE 1000 ML: 9 INJECTION, SOLUTION INTRAVENOUS at 08:47

## 2022-01-03 RX ADMIN — ALBUTEROL SULFATE 2 PUFF: 90 AEROSOL, METERED RESPIRATORY (INHALATION) at 08:37

## 2022-01-03 NOTE — ED PROVIDER NOTES
"  History   Chief Complaint:  Shortness of Breath       The history is provided by the patient.      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  He notes that 3 days ago he experienced runny nose and some sinus pressure.  Subsequently he has had postnasal drip, cough, and evolution of wheezing consistent with his asthma.  He typically does not take albuterol or any other medications, endorsing a \"natural\" approach to managing his symptoms.  He feels short of breath consistent with his asthma.  He denies chest pain, fever, myalgias, chills, or any other concerns.        Review of Systems  ENT: + as per above  Resp: + as per above  All other systems reviewed and negative      Allergies:  No Known Allergies    Medications:  Albuterol   Augmentin   Norco   Medrol Dosepak     Past Medical History:     Elevated blood pressure   Anxiety   HLD   Facial cellulitis     Past Surgical History:    Patient denies pertinent past surgical history.     Family History:    Mother - diabetes mellitus, HTN   Son - autism   Daughter - ADD    Social History:  Patient presents to the ED alone via car  Hx to alcohol use and tobacco use (current smoker and smokeless tobacco user)      Physical Exam     Patient Vitals for the past 24 hrs:   BP Temp Pulse Resp SpO2   01/03/22 0945 (!) 136/98 -- 87 20 98 %   01/03/22 0900 (!) 145/109 -- 95 24 98 %   01/03/22 0830 -- -- 108 24 96 %   01/03/22 0817 (!) 168/119 99.3  F (37.4  C) 103 26 97 %       Physical Exam  Constitutional: Alert, attentive, speaking full sentences  HENT:    Nose: Nose normal.    Mouth/Throat: Oropharynx is clear, mucous membranes are moist   Eyes: EOM are normal.   CV: tachycardic, regular rhythm; no murmurs, rubs or gallups  Chest: Effort normal but + bilateral end expiratory wheezes  GI:  There is no tenderness. No distension. " Normal bowel sounds  MSK: Normal range of motion.   Neurological: Alert, attentive  Skin: Skin is warm and dry.    Emergency Department Course   EC  ECG taken at 0825, ECG read at 0828  Sinus tachycardia   Otherwise normal ECG   Rate 103 bpm. GA interval 154 ms. QRS duration 94 ms. QT/QTc 340/445 ms. P-R-T axes 67 25 52.     Laboratory:  Labs Ordered and Resulted from Time of ED Arrival to Time of ED Departure   BASIC METABOLIC PANEL - Abnormal       Result Value    Sodium 138      Potassium 4.0      Chloride 108      Carbon Dioxide (CO2) 24      Anion Gap 6      Urea Nitrogen 10      Creatinine 0.75      Calcium 8.6      Glucose 106 (*)     GFR Estimate >90     INFLUENZA A/B & SARS-COV2 PCR MULTIPLEX - Normal    Influenza A PCR Negative      Influenza B PCR Negative      SARS CoV2 PCR Negative     CBC WITH PLATELETS AND DIFFERENTIAL    WBC Count 9.4      RBC Count 5.04      Hemoglobin 15.5      Hematocrit 45.4      MCV 90      MCH 30.8      MCHC 34.1      RDW 12.8      Platelet Count 259      % Neutrophils 70      % Lymphocytes 15      % Monocytes 11      % Eosinophils 4      % Basophils 0      % Immature Granulocytes 0      NRBCs per 100 WBC 0      Absolute Neutrophils 6.5      Absolute Lymphocytes 1.4      Absolute Monocytes 1.0      Absolute Eosinophils 0.4      Absolute Basophils 0.0      Absolute Immature Granulocytes 0.0      Absolute NRBCs 0.0          Emergency Department Course:    Reviewed:  I reviewed nursing notes, vitals, past medical history and Care Everywhere    Assessments:  08 I obtained history and examined the patient as noted above.   09 I rechecked the patient and explained findings. I discussed plan for discharge home.    Interventions:  0837 Albuterol 2 puff inhalation   0847 NS 1 L IV   0848 Albuterol 4 puff inhalation   0852 Deltasone 60 mg PO    Disposition:  The patient was discharged to home.     Impression & Plan     Medical Decision Making:  This is a pleasant 46-year-old male  with history of asthma, vaccinated against influenza and COVID-19, who presents for evaluation of URI symptoms and wheezing.  Exam is consistent with acute asthma exacerbation.  Symptoms are improved with prednisone and multiple albuterol inhaler puffs.  COVID-19 influenza and influenza testing are negative.  Symptoms are dramatically improved.  I recommended observation x2 hours.  Patient noted significant improvement and would like to leave.  I had a shared decision made conversation regarding potential rebound symptoms he agrees to return should he worsen.  Plan for more days of prednisone and continued albuterol therapy.  Absent chest pain, asymmetric lung findings, or hypoxia, no indication for chest x-ray at this time.  Symptoms are consistent with asthma exacerbation, especially with resolution of bronchospasm, so PE testing is indicated at this time.  Plan primary care follow-up in 3 to 5 days return precaution for chest pain, shortness of breath, or any other concerns.    Critical Care Time: None     Diagnosis:    ICD-10-CM    1. Mild intermittent asthma with exacerbation  J45.21        Discharge Medications:  Discharge Medication List as of 1/3/2022  9:45 AM      START taking these medications    Details   !! albuterol (PROAIR HFA) 108 (90 Base) MCG/ACT inhaler Inhale 2 puffs into the lungs every 4 hours as needed for shortness of breath / dyspnea, Disp-8 g, R-0, E-Prescribe      predniSONE (DELTASONE) 20 MG tablet Take 2 tablets (40 mg) by mouth daily for 4 days, Disp-8 tablet, R-0, E-Prescribe       !! - Potential duplicate medications found. Please discuss with provider.          Scribe Disclosure:  I, Brice Arzola, am serving as a scribe at 8:25 AM on 1/3/2022 to document services personally performed by Unruly Ivan MD based on my observations and the provider's statements to me.        Unruly Ivan MD  01/03/22 [id number]

## 2022-01-03 NOTE — ED TRIAGE NOTES
Patient presents with complaints of cough and SOB for the past few days. Patient is not COVID vaccinated. ABC intact without need for intervention at this time.

## 2022-02-19 ENCOUNTER — HEALTH MAINTENANCE LETTER (OUTPATIENT)
Age: 47
End: 2022-02-19

## 2022-04-06 ENCOUNTER — APPOINTMENT (OUTPATIENT)
Dept: CT IMAGING | Facility: CLINIC | Age: 47
End: 2022-04-06
Attending: EMERGENCY MEDICINE
Payer: COMMERCIAL

## 2022-04-06 ENCOUNTER — HOSPITAL ENCOUNTER (EMERGENCY)
Facility: CLINIC | Age: 47
Discharge: HOME | End: 2022-04-06
Attending: EMERGENCY MEDICINE | Admitting: EMERGENCY MEDICINE
Payer: COMMERCIAL

## 2022-04-06 VITALS
BODY MASS INDEX: 28.34 KG/M2 | TEMPERATURE: 97.5 F | SYSTOLIC BLOOD PRESSURE: 126 MMHG | HEART RATE: 81 BPM | WEIGHT: 197.53 LBS | OXYGEN SATURATION: 95 % | RESPIRATION RATE: 11 BRPM | DIASTOLIC BLOOD PRESSURE: 96 MMHG

## 2022-04-06 DIAGNOSIS — M54.2 NECK PAIN: ICD-10-CM

## 2022-04-06 DIAGNOSIS — S06.0X0A CONCUSSION WITHOUT LOSS OF CONSCIOUSNESS, INITIAL ENCOUNTER: ICD-10-CM

## 2022-04-06 DIAGNOSIS — M54.50 LOW BACK PAIN, UNSPECIFIED BACK PAIN LATERALITY, UNSPECIFIED CHRONICITY, UNSPECIFIED WHETHER SCIATICA PRESENT: ICD-10-CM

## 2022-04-06 LAB
ANION GAP SERPL CALCULATED.3IONS-SCNC: 4 MMOL/L (ref 3–14)
ATRIAL RATE - MUSE: 93 BPM
BASOPHILS # BLD AUTO: 0 10E3/UL (ref 0–0.2)
BASOPHILS NFR BLD AUTO: 0 %
BUN SERPL-MCNC: 10 MG/DL (ref 7–30)
CALCIUM SERPL-MCNC: 8.9 MG/DL (ref 8.5–10.1)
CHLORIDE BLD-SCNC: 104 MMOL/L (ref 94–109)
CO2 SERPL-SCNC: 26 MMOL/L (ref 20–32)
CREAT SERPL-MCNC: 0.91 MG/DL (ref 0.66–1.25)
DIASTOLIC BLOOD PRESSURE - MUSE: NORMAL MMHG
EOSINOPHIL # BLD AUTO: 0.1 10E3/UL (ref 0–0.7)
EOSINOPHIL NFR BLD AUTO: 1 %
ERYTHROCYTE [DISTWIDTH] IN BLOOD BY AUTOMATED COUNT: 12.4 % (ref 10–15)
GFR SERPL CREATININE-BSD FRML MDRD: >90 ML/MIN/1.73M2
GLUCOSE BLD-MCNC: 99 MG/DL (ref 70–99)
HCT VFR BLD AUTO: 47.6 % (ref 40–53)
HGB BLD-MCNC: 16.4 G/DL (ref 13.3–17.7)
HOLD SPECIMEN: NORMAL
IMM GRANULOCYTES # BLD: 0 10E3/UL
IMM GRANULOCYTES NFR BLD: 0 %
INTERPRETATION ECG - MUSE: NORMAL
LYMPHOCYTES # BLD AUTO: 1.5 10E3/UL (ref 0.8–5.3)
LYMPHOCYTES NFR BLD AUTO: 17 %
MCH RBC QN AUTO: 30.5 PG (ref 26.5–33)
MCHC RBC AUTO-ENTMCNC: 34.5 G/DL (ref 31.5–36.5)
MCV RBC AUTO: 89 FL (ref 78–100)
MONOCYTES # BLD AUTO: 0.9 10E3/UL (ref 0–1.3)
MONOCYTES NFR BLD AUTO: 10 %
NEUTROPHILS # BLD AUTO: 6.4 10E3/UL (ref 1.6–8.3)
NEUTROPHILS NFR BLD AUTO: 72 %
NRBC # BLD AUTO: 0 10E3/UL
NRBC BLD AUTO-RTO: 0 /100
P AXIS - MUSE: 51 DEGREES
PLATELET # BLD AUTO: 243 10E3/UL (ref 150–450)
POTASSIUM BLD-SCNC: 3.9 MMOL/L (ref 3.4–5.3)
PR INTERVAL - MUSE: 140 MS
QRS DURATION - MUSE: 90 MS
QT - MUSE: 348 MS
QTC - MUSE: 432 MS
R AXIS - MUSE: 29 DEGREES
RBC # BLD AUTO: 5.38 10E6/UL (ref 4.4–5.9)
SODIUM SERPL-SCNC: 134 MMOL/L (ref 133–144)
SYSTOLIC BLOOD PRESSURE - MUSE: NORMAL MMHG
T AXIS - MUSE: 51 DEGREES
VENTRICULAR RATE- MUSE: 93 BPM
WBC # BLD AUTO: 8.9 10E3/UL (ref 4–11)

## 2022-04-06 PROCEDURE — 72131 CT LUMBAR SPINE W/O DYE: CPT

## 2022-04-06 PROCEDURE — 250N000013 HC RX MED GY IP 250 OP 250 PS 637: Performed by: EMERGENCY MEDICINE

## 2022-04-06 PROCEDURE — 96375 TX/PRO/DX INJ NEW DRUG ADDON: CPT

## 2022-04-06 PROCEDURE — 250N000011 HC RX IP 250 OP 636: Performed by: EMERGENCY MEDICINE

## 2022-04-06 PROCEDURE — 72125 CT NECK SPINE W/O DYE: CPT

## 2022-04-06 PROCEDURE — 80048 BASIC METABOLIC PNL TOTAL CA: CPT | Performed by: EMERGENCY MEDICINE

## 2022-04-06 PROCEDURE — 72128 CT CHEST SPINE W/O DYE: CPT

## 2022-04-06 PROCEDURE — 96374 THER/PROPH/DIAG INJ IV PUSH: CPT

## 2022-04-06 PROCEDURE — 36415 COLL VENOUS BLD VENIPUNCTURE: CPT | Performed by: EMERGENCY MEDICINE

## 2022-04-06 PROCEDURE — 85025 COMPLETE CBC W/AUTO DIFF WBC: CPT | Performed by: EMERGENCY MEDICINE

## 2022-04-06 PROCEDURE — 70450 CT HEAD/BRAIN W/O DYE: CPT

## 2022-04-06 PROCEDURE — 99285 EMERGENCY DEPT VISIT HI MDM: CPT | Mod: 25

## 2022-04-06 PROCEDURE — 93005 ELECTROCARDIOGRAM TRACING: CPT

## 2022-04-06 RX ORDER — KETOROLAC TROMETHAMINE 15 MG/ML
15 INJECTION, SOLUTION INTRAMUSCULAR; INTRAVENOUS ONCE
Status: COMPLETED | OUTPATIENT
Start: 2022-04-06 | End: 2022-04-06

## 2022-04-06 RX ORDER — ONDANSETRON 2 MG/ML
4 INJECTION INTRAMUSCULAR; INTRAVENOUS ONCE
Status: COMPLETED | OUTPATIENT
Start: 2022-04-06 | End: 2022-04-06

## 2022-04-06 RX ORDER — ACETAMINOPHEN 500 MG
1000 TABLET ORAL ONCE
Status: COMPLETED | OUTPATIENT
Start: 2022-04-06 | End: 2022-04-06

## 2022-04-06 RX ORDER — HYDROMORPHONE HYDROCHLORIDE 1 MG/ML
0.5 INJECTION, SOLUTION INTRAMUSCULAR; INTRAVENOUS; SUBCUTANEOUS ONCE
Status: COMPLETED | OUTPATIENT
Start: 2022-04-06 | End: 2022-04-06

## 2022-04-06 RX ORDER — OXYCODONE HYDROCHLORIDE 5 MG/1
5 TABLET ORAL EVERY 6 HOURS PRN
Qty: 12 TABLET | Refills: 0 | Status: SHIPPED | OUTPATIENT
Start: 2022-04-06 | End: 2022-04-09

## 2022-04-06 RX ADMIN — ACETAMINOPHEN 1000 MG: 500 TABLET, FILM COATED ORAL at 04:09

## 2022-04-06 RX ADMIN — ONDANSETRON 4 MG: 2 INJECTION INTRAMUSCULAR; INTRAVENOUS at 03:25

## 2022-04-06 RX ADMIN — KETOROLAC TROMETHAMINE 15 MG: 15 INJECTION, SOLUTION INTRAMUSCULAR; INTRAVENOUS at 05:12

## 2022-04-06 RX ADMIN — HYDROMORPHONE HYDROCHLORIDE 0.5 MG: 1 INJECTION, SOLUTION INTRAMUSCULAR; INTRAVENOUS; SUBCUTANEOUS at 03:24

## 2022-04-06 ASSESSMENT — ENCOUNTER SYMPTOMS
SHORTNESS OF BREATH: 0
NAUSEA: 1
HEADACHES: 1
SPEECH DIFFICULTY: 0
NECK PAIN: 1
NUMBNESS: 0
BACK PAIN: 1
WEAKNESS: 0

## 2022-04-06 ASSESSMENT — VISUAL ACUITY
OD: 20/40
OS: 20/20

## 2022-04-06 NOTE — ED PROVIDER NOTES
History     Chief Complaint:  Head Injury    The history is provided by the patient and the spouse.      Maximino Bone is a 46 year old male who presents with a head injury. The patient reports leaning forward and hitting his head on a metal bar in the bathroom at his work while going to sit down on the toilet. This happened approximately 25 hours ago, at around 0200 yesterday. He denies any loss of consciousness. In the time since, he has been experiencing a headache, neck pain, nausea, and blurred vision in his right eye, describing it as having difficulty focusing his vision in this eye though states this vision change is somewhat old. He also complains of new upper back pain, but notes chronic lower back pain. Patient denies any speech changes, weakness, numbness, chest pain, shortness of breath, or drug use. Patient has not yet taken anything to help manage his pain.     Review of Systems   Eyes: Positive for visual disturbance.   Respiratory: Negative for shortness of breath.    Cardiovascular: Negative for chest pain.   Gastrointestinal: Positive for nausea.   Musculoskeletal: Positive for back pain and neck pain.   Neurological: Positive for headaches. Negative for syncope, speech difficulty, weakness and numbness.   All other systems reviewed and are negative.    Allergies:  The patient has no known allergies.    Medications:    Albuterol  Augmentin  Norco  Medrol Dosepak     Past Medical History:    Facial cellulitis  Hyperlipidemia  Anxiety  Asthma     Family History:    Mother: diabetes, hypertension     Social History:  Presents with wife.   Patient works as a CNA.     Physical Exam     Patient Vitals for the past 24 hrs:   BP Temp Temp src Pulse Resp SpO2 Weight   04/06/22 0445 (!) 138/100 -- -- 81 11 95 % --   04/06/22 0430 (!) 126/96 -- -- 85 15 95 % --   04/06/22 0415 (!) 147/100 -- -- 84 15 97 % --   04/06/22 0404 (!) 144/98 -- -- 87 12 100 % --   04/06/22 0345 -- -- -- 86 25 96 % --   04/06/22  0330 (!) 143/97 -- -- 92 17 97 % --   04/06/22 0315 (!) 152/119 -- -- 99 (!) 36 97 % --   04/06/22 0305 -- -- -- -- -- -- 89.6 kg (197 lb 8.5 oz)   04/06/22 0303 (!) 143/112 97.5  F (36.4  C) Temporal 105 20 100 % --       Physical Exam  General: Alert.   Head:  The scalp, face, and head appear normal without trauma  Eyes:  Sclera white; Pupils are equal and round  Right eye visual acuity: 20/40  Left eye: 20/20  ENT:    External ears normal.  No hemotympanum.      External nares normal.  No septal hematoma.    Neck:  Midline tenderness  CV:  Rate as above with regular rhythm   No murmur   2/2 radial and dorsal pedal pulses  Resp:  Breath sounds clear and equal bilaterally    Non-labored, no retractions or accessory muscle use  GI:  Abdomen soft, non-tender, non-distended    No rebound tenderness or guarding  MSK:  No midline tenderness or bony step-off    No deformity    Moves all extremities equally and symmetrically  Skin:  No rash or lesions noted.  Neuro:   No apparent deficit.    Strength 5/5 x4.  Sensation intact x4.     GCS: 15  Psych:  Normal affect.        Emergency Department Course   ECG:  ECG taken at 0311, ECG read at 0343  Normal sinus rhythm.    Rate 93 bpm. HI interval 140 ms. QRS duration 90 ms. QT/QTc 348/432 ms. P-R-T axes 51 29 51.     Imaging:  Lumbar spine CT w/o contrast   Final Result   IMPRESSION:   HEAD CT:   1.  No acute intracranial process.      CERVICAL SPINE CT:   1.  No fracture or posttraumatic subluxation.   2.  No high-grade spinal canal or neural foraminal stenosis.      THORACIC SPINE CT:   1.  No fracture or posttraumatic subluxation.   2.  No high-grade spinal canal or neural foraminal stenosis.      LUMBAR SPINE CT:   1.  No fracture or posttraumatic subluxation.   2.  No high-grade spinal canal or neural foraminal stenosis.      Cervical spine CT w/o contrast   Final Result   IMPRESSION:   HEAD CT:   1.  No acute intracranial process.      CERVICAL SPINE CT:   1.  No  fracture or posttraumatic subluxation.   2.  No high-grade spinal canal or neural foraminal stenosis.      THORACIC SPINE CT:   1.  No fracture or posttraumatic subluxation.   2.  No high-grade spinal canal or neural foraminal stenosis.      LUMBAR SPINE CT:   1.  No fracture or posttraumatic subluxation.   2.  No high-grade spinal canal or neural foraminal stenosis.      CT Thoracic Spine w/o Contrast   Final Result   IMPRESSION:   HEAD CT:   1.  No acute intracranial process.      CERVICAL SPINE CT:   1.  No fracture or posttraumatic subluxation.   2.  No high-grade spinal canal or neural foraminal stenosis.      THORACIC SPINE CT:   1.  No fracture or posttraumatic subluxation.   2.  No high-grade spinal canal or neural foraminal stenosis.      LUMBAR SPINE CT:   1.  No fracture or posttraumatic subluxation.   2.  No high-grade spinal canal or neural foraminal stenosis.      Head CT w/o contrast   Final Result   IMPRESSION:   HEAD CT:   1.  No acute intracranial process.      CERVICAL SPINE CT:   1.  No fracture or posttraumatic subluxation.   2.  No high-grade spinal canal or neural foraminal stenosis.      THORACIC SPINE CT:   1.  No fracture or posttraumatic subluxation.   2.  No high-grade spinal canal or neural foraminal stenosis.      LUMBAR SPINE CT:   1.  No fracture or posttraumatic subluxation.   2.  No high-grade spinal canal or neural foraminal stenosis.          Laboratory:  Labs Ordered and Resulted from Time of ED Arrival to Time of ED Departure   BASIC METABOLIC PANEL - Normal       Result Value    Sodium 134      Potassium 3.9      Chloride 104      Carbon Dioxide (CO2) 26      Anion Gap 4      Urea Nitrogen 10      Creatinine 0.91      Calcium 8.9      Glucose 99      GFR Estimate >90     CBC WITH PLATELETS AND DIFFERENTIAL    WBC Count 8.9      RBC Count 5.38      Hemoglobin 16.4      Hematocrit 47.6      MCV 89      MCH 30.5      MCHC 34.5      RDW 12.4      Platelet Count 243      %  "Neutrophils 72      % Lymphocytes 17      % Monocytes 10      % Eosinophils 1      % Basophils 0      % Immature Granulocytes 0      NRBCs per 100 WBC 0      Absolute Neutrophils 6.4      Absolute Lymphocytes 1.5      Absolute Monocytes 0.9      Absolute Eosinophils 0.1      Absolute Basophils 0.0      Absolute Immature Granulocytes 0.0      Absolute NRBCs 0.0       Emergency Department Course:       Reviewed:  I reviewed nursing notes, vitals, past history and care everywhere    Assessments:  0311 I obtained history and examined the patient as noted above.   0508 I rechecked the patient and explained findings. Prepared for discharge.         Interventions:  0325 Zofran 4 mg IV   0324 Dilaudid 0.5 mg IV   0409 Tylenol 1,000 PO     Disposition:  The patient was discharged to home.    Impression & Plan    Medical Decision Making:  Patient is a 46-year-old male presenting with reported head injury over 25 hours prior to arrival.  He also complains of neck pain and back pain.  Patient was placed in a c-collar on arrival given his reproducible midline tenderness.  He underwent CT imaging head, cervical, thoracic and lumbar spine all of which are fortunately reassuring.  I doubt spinal cord injury or vascular injury at this point in time based on exam.  I do have a strong suspicion however for concussion.  The patient/family understand that they must return if any \"red flags\" appear/develop in the coming hours/days, as this may represent an indication to perform a CT scan.  I have noted that \"red flags\" include: headaches that get worse, increased drowsiness, strange behavior, repetitive speech, seizures, repeated vomiting, growing confusion, increased irritability, slurred speech, weakness or numbness, and loss of responsiveness.  This information will also be provided in writing at discharge.  I have discussed the second impact syndrome, and the importance of not sustaining repeated concussion in the next 1-2 weeks.  " Post concussive syndrome was also discussed. I will place a concussion  referral which I discussed with patient.     Regarding patient's related cervical and thoracic pain, I suspect this is more musculoskeletal in nature.  I will provide oxycodone for breakthrough pain and recommended NSAIDs as well.  Regarding patient's reported vision changes he does note that this is somewhat of a chronic issue.  I do not feel further emergent work-up is warranted at this point in time.  The remainder of his head to toe exam is without injury.  Patient reported some symptom improvement.  Wife and patient feel comfortable with plans for close outpatient follow-up. All questions addressed.      Diagnosis:    ICD-10-CM    1. Concussion without loss of consciousness, initial encounter  S06.0X0A Concussion  Referral   2. Neck pain  M54.2    3. Low back pain, unspecified back pain laterality, unspecified chronicity, unspecified whether sciatica present  M54.50        Discharge Medications:  New Prescriptions    OXYCODONE (ROXICODONE) 5 MG TABLET    Take 1 tablet (5 mg) by mouth every 6 hours as needed for pain     Scribe Disclosure:  I, Edward Cortes, am serving as a scribe at 3:08 AM on 4/6/2022 to document services personally performed by Georgina Hawkins DO based on my observations and the provider's statements to me.      Georgina Hawkins DO  04/06/22 0696

## 2022-04-06 NOTE — ED TRIAGE NOTES
Pt arrives to ED after a head injury over 24 hours ago. Pt hit the top of his head on a bathroom pull bar. Denies LOC or thinners. C/o headache, neck pain, nausea, and blurred vision in R eye. No meds PTA.

## 2022-04-11 ENCOUNTER — VIRTUAL VISIT (OUTPATIENT)
Dept: FAMILY MEDICINE | Facility: CLINIC | Age: 47
End: 2022-04-11
Payer: COMMERCIAL

## 2022-04-11 DIAGNOSIS — S06.0X0D CONCUSSION WITHOUT LOSS OF CONSCIOUSNESS, SUBSEQUENT ENCOUNTER: Primary | ICD-10-CM

## 2022-04-11 DIAGNOSIS — G44.209 TENSION HEADACHE: ICD-10-CM

## 2022-04-11 PROCEDURE — 99204 OFFICE O/P NEW MOD 45 MIN: CPT | Mod: 95 | Performed by: FAMILY MEDICINE

## 2022-04-11 RX ORDER — OXYCODONE AND ACETAMINOPHEN 5; 325 MG/1; MG/1
1 TABLET ORAL EVERY 6 HOURS PRN
COMMUNITY

## 2022-04-11 RX ORDER — CYCLOBENZAPRINE HCL 10 MG
10 TABLET ORAL 3 TIMES DAILY PRN
Qty: 60 TABLET | Refills: 0 | Status: SHIPPED | OUTPATIENT
Start: 2022-04-11

## 2022-04-11 NOTE — PROGRESS NOTES
Maximino is a 46 year old who is being evaluated via a billable video visit.      How would you like to obtain your AVS? MyChart  If the video visit is dropped, the invitation should be resent by: Text to cell phone: [phone] send link to cell  Will anyone else be joining your video visit? No    Video Start Time: 10:33 AM    Assessment & Plan     Concussion without loss of consciousness, subsequent encounter  Patient referred for concussion evaluation  - Concussion  Referral; Future  - cyclobenzaprine (FLEXERIL) 10 MG tablet; Take 1 tablet (10 mg) by mouth as needed in the morning and 1 tablet (10 mg) as needed at noon and 1 tablet (10 mg) as needed in the evening for muscle spasms.    Tension headache  Likely due to the concussion. Recommend taking the muscle relaxants.        FUTURE APPOINTMENTS:       - Follow-up visit in one month or sooner as needed    Return in about 4 weeks (around 5/9/2022).    Bairon Waggoner MD  [facility]    Subjective      Patient is a 46-year-old male here for follow-up from an emergency room visit.  He was seen for a head injury which he incurred at work he said he was trying to use the bathroom and hit his head on the metal bar in the bathroom.  He has been having headaches frequentlty since the incidence.      He was prescribed some Percocet in the ER which has helped a little bit.  He reports he also has low back pain and pain behind his knees and thighs.  He was told he should get a referral for concussion evaluation.  He is back at work.  Denies any systemic symptoms.        Maximino is a 46 year old who presents for the following health issues     History of Present Illness       Back Pain:  He presents for follow up of back pain. Patient's back pain is a new problem.    Original cause of back pain: a fall  First noticed back pain: 1-4 weeks ago  Patient feels back pain: constantlyLocation of back pain:  Left upper back  Description of  "back pain: burning, dull ache and sharp  Back pain spreads: right side of neck and left side of neck    Since patient first noticed back pain, pain is: unchanged  Does back pain interfere with his job:  No  On a scale of 1-10 (10 being the worst), patient describes pain as:  7  What makes back pain worse: bending, coughing, lying down, sitting and standing  Acupuncture: not tried  Acetaminophen: not helpful  Activity or exercise: not helpful  Chiropractor:  Not tried  Cold: not helpful  Heat: not helpful  Massage: not helpful  Muscle relaxants: not tried  NSAIDS: not helpful  Opioids: helpful  Physical Therapy: not tried  Rest: not helpful  Steroid Injection: not tried  Stretching: not helpful  Surgery: not tried  TENS unit: not tried  Topical pain relievers: not tried  Other healthcare providers patient is seeing for back pain: None    Migraines:   Since the patient's last clinic visit, headaches are: no change  The patient is getting headaches:  Daily  He is able to do normal daily activities when he has a migraine.  The patient is taking the following rescue/relief medications:  Other   Patient states \"I get some relief\" from the rescue/relief medications.   The patient is taking the following medications to prevent migraines:  No medications to prevent migraines  In the past 4 weeks, the patient has gone to an Urgent Care or Emergency Room 1 time times due to headaches.    He eats 0-1 servings of fruits and vegetables daily.He consumes 7 sweetened beverage(s) daily.He exercises with enough effort to increase his heart rate 60 or more minutes per day.  He exercises with enough effort to increase his heart rate 4 days per week.   He is taking medications regularly.       ED/UC Followup:    Facility:  [facility]  Date of visit: 04/06/2022  Reason for visit: concussion w/o loss of consciousness  Current Status: having back pain and daily headaches about 6 or 7/10 on pain scale. Not as painful as " when he went to the ED. Having trouble turning. 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 Tylenol barely touches the pain. Is using his prescribed Percocet which does help, he is concerned about taking too much ibuprofen so he has been rotating the analgesic medications.           Review of Systems   Constitutional, HEENT, cardiovascular, pulmonary, gi and gu systems are negative, except as otherwise noted.      Objective           Vitals:  No vitals were obtained today due to virtual visit.    Physical Exam   GENERAL: Healthy, alert and no distress  EYES: Eyes grossly normal to inspection.  No discharge or erythema, or obvious scleral/conjunctival abnormalities.  RESP: No audible wheeze, cough, or visible cyanosis.  No visible retractions or increased work of breathing.    SKIN: Visible skin clear. No significant rash, abnormal pigmentation or lesions.  NEURO: Cranial nerves grossly intact.  Mentation and speech appropriate for age.  PSYCH: Mentation appears normal, affect normal/bright, judgement and insight intact, normal speech and appearance well-groomed.            Video-Visit Details    Type of service:  Video Visit    Video End Time:10:41 AM    Originating Location (pt. Location): Home    Distant Location (provider location):  [facility]     Platform used for Video Visit: [place]

## 2022-10-22 ENCOUNTER — HEALTH MAINTENANCE LETTER (OUTPATIENT)
Age: 47
End: 2022-10-22

## 2023-01-27 ENCOUNTER — HOSPITAL ENCOUNTER (EMERGENCY)
Facility: CLINIC | Age: 48
Discharge: HOME | End: 2023-01-28
Attending: EMERGENCY MEDICINE | Admitting: EMERGENCY MEDICINE
Payer: COMMERCIAL

## 2023-01-27 ENCOUNTER — APPOINTMENT (OUTPATIENT)
Dept: ULTRASOUND IMAGING | Facility: CLINIC | Age: 48
End: 2023-01-27
Attending: EMERGENCY MEDICINE
Payer: COMMERCIAL

## 2023-01-27 ENCOUNTER — APPOINTMENT (OUTPATIENT)
Dept: CT IMAGING | Facility: CLINIC | Age: 48
End: 2023-01-27
Attending: EMERGENCY MEDICINE
Payer: COMMERCIAL

## 2023-01-27 DIAGNOSIS — M54.2 NECK PAIN: ICD-10-CM

## 2023-01-27 DIAGNOSIS — M54.12 CERVICAL RADICULOPATHY: ICD-10-CM

## 2023-01-27 LAB
ANION GAP SERPL CALCULATED.3IONS-SCNC: 13 MMOL/L (ref 7–15)
BASOPHILS # BLD AUTO: 0 10E3/UL (ref 0–0.2)
BASOPHILS NFR BLD AUTO: 1 %
BUN SERPL-MCNC: 14 MG/DL (ref 6–20)
CALCIUM SERPL-MCNC: 9 MG/DL (ref 8.6–10)
CHLORIDE SERPL-SCNC: 100 MMOL/L (ref 98–107)
CREAT SERPL-MCNC: 0.86 MG/DL (ref 0.67–1.17)
DEPRECATED HCO3 PLAS-SCNC: 22 MMOL/L (ref 22–29)
EOSINOPHIL # BLD AUTO: 0.3 10E3/UL (ref 0–0.7)
EOSINOPHIL NFR BLD AUTO: 4 %
ERYTHROCYTE [DISTWIDTH] IN BLOOD BY AUTOMATED COUNT: 13 % (ref 10–15)
GFR SERPL CREATININE-BSD FRML MDRD: >90 ML/MIN/1.73M2
GLUCOSE SERPL-MCNC: 102 MG/DL (ref 70–99)
HCT VFR BLD AUTO: 44.1 % (ref 40–53)
HGB BLD-MCNC: 15.3 G/DL (ref 13.3–17.7)
IMM GRANULOCYTES # BLD: 0 10E3/UL
IMM GRANULOCYTES NFR BLD: 0 %
LYMPHOCYTES # BLD AUTO: 2.6 10E3/UL (ref 0.8–5.3)
LYMPHOCYTES NFR BLD AUTO: 30 %
MCH RBC QN AUTO: 31.5 PG (ref 26.5–33)
MCHC RBC AUTO-ENTMCNC: 34.7 G/DL (ref 31.5–36.5)
MCV RBC AUTO: 91 FL (ref 78–100)
MONOCYTES # BLD AUTO: 0.9 10E3/UL (ref 0–1.3)
MONOCYTES NFR BLD AUTO: 11 %
NEUTROPHILS # BLD AUTO: 4.6 10E3/UL (ref 1.6–8.3)
NEUTROPHILS NFR BLD AUTO: 54 %
NRBC # BLD AUTO: 0 10E3/UL
NRBC BLD AUTO-RTO: 0 /100
PLATELET # BLD AUTO: 266 10E3/UL (ref 150–450)
POTASSIUM SERPL-SCNC: 4.3 MMOL/L (ref 3.4–5.3)
RBC # BLD AUTO: 4.85 10E6/UL (ref 4.4–5.9)
SODIUM SERPL-SCNC: 135 MMOL/L (ref 136–145)
TROPONIN T SERPL HS-MCNC: 8 NG/L
WBC # BLD AUTO: 8.4 10E3/UL (ref 4–11)

## 2023-01-27 PROCEDURE — 93005 ELECTROCARDIOGRAM TRACING: CPT | Mod: RTG

## 2023-01-27 PROCEDURE — 250N000013 HC RX MED GY IP 250 OP 250 PS 637: Performed by: EMERGENCY MEDICINE

## 2023-01-27 PROCEDURE — 85025 COMPLETE CBC W/AUTO DIFF WBC: CPT | Performed by: EMERGENCY MEDICINE

## 2023-01-27 PROCEDURE — 250N000011 HC RX IP 250 OP 636: Performed by: EMERGENCY MEDICINE

## 2023-01-27 PROCEDURE — 93971 EXTREMITY STUDY: CPT | Mod: LT

## 2023-01-27 PROCEDURE — 36415 COLL VENOUS BLD VENIPUNCTURE: CPT | Performed by: EMERGENCY MEDICINE

## 2023-01-27 PROCEDURE — 84484 ASSAY OF TROPONIN QUANT: CPT | Performed by: EMERGENCY MEDICINE

## 2023-01-27 PROCEDURE — 72125 CT NECK SPINE W/O DYE: CPT

## 2023-01-27 PROCEDURE — 99285 EMERGENCY DEPT VISIT HI MDM: CPT | Mod: 25

## 2023-01-27 PROCEDURE — 82310 ASSAY OF CALCIUM: CPT | Performed by: EMERGENCY MEDICINE

## 2023-01-27 RX ORDER — OXYCODONE HYDROCHLORIDE 5 MG/1
5 TABLET ORAL ONCE
Status: COMPLETED | OUTPATIENT
Start: 2023-01-27 | End: 2023-01-27

## 2023-01-27 RX ORDER — METHYLPREDNISOLONE SODIUM SUCCINATE 125 MG/2ML
125 INJECTION, POWDER, LYOPHILIZED, FOR SOLUTION INTRAMUSCULAR; INTRAVENOUS ONCE
Status: COMPLETED | OUTPATIENT
Start: 2023-01-27 | End: 2023-01-27

## 2023-01-27 RX ORDER — IOPAMIDOL 755 MG/ML
500 INJECTION, SOLUTION INTRAVASCULAR ONCE
Status: DISCONTINUED | OUTPATIENT
Start: 2023-01-27 | End: 2023-01-27

## 2023-01-27 RX ORDER — DIAZEPAM 5 MG
5 TABLET ORAL ONCE
Status: COMPLETED | OUTPATIENT
Start: 2023-01-27 | End: 2023-01-27

## 2023-01-27 RX ORDER — ONDANSETRON 4 MG/1
4 TABLET, ORALLY DISINTEGRATING ORAL ONCE
Status: COMPLETED | OUTPATIENT
Start: 2023-01-27 | End: 2023-01-27

## 2023-01-27 RX ORDER — KETOROLAC TROMETHAMINE 15 MG/ML
15 INJECTION, SOLUTION INTRAMUSCULAR; INTRAVENOUS ONCE
Status: COMPLETED | OUTPATIENT
Start: 2023-01-27 | End: 2023-01-27

## 2023-01-27 RX ORDER — HYDROMORPHONE HYDROCHLORIDE 1 MG/ML
0.5 INJECTION, SOLUTION INTRAMUSCULAR; INTRAVENOUS; SUBCUTANEOUS
Status: DISCONTINUED | OUTPATIENT
Start: 2023-01-27 | End: 2023-01-28 | Stop reason: HOSPADM

## 2023-01-27 RX ORDER — ACETAMINOPHEN 500 MG
1000 TABLET ORAL ONCE
Status: COMPLETED | OUTPATIENT
Start: 2023-01-27 | End: 2023-01-27

## 2023-01-27 RX ADMIN — HYDROMORPHONE HYDROCHLORIDE 1 MG: 1 INJECTION, SOLUTION INTRAMUSCULAR; INTRAVENOUS; SUBCUTANEOUS at 22:20

## 2023-01-27 RX ADMIN — ACETAMINOPHEN 1000 MG: 500 TABLET ORAL at 19:15

## 2023-01-27 RX ADMIN — HYDROMORPHONE HYDROCHLORIDE 1 MG: 1 INJECTION, SOLUTION INTRAMUSCULAR; INTRAVENOUS; SUBCUTANEOUS at 23:54

## 2023-01-27 RX ADMIN — OXYCODONE HYDROCHLORIDE 5 MG: 5 TABLET ORAL at 20:27

## 2023-01-27 RX ADMIN — DIAZEPAM 5 MG: 5 TABLET ORAL at 20:27

## 2023-01-27 RX ADMIN — HYDROMORPHONE HYDROCHLORIDE 0.5 MG: 1 INJECTION, SOLUTION INTRAMUSCULAR; INTRAVENOUS; SUBCUTANEOUS at 21:15

## 2023-01-27 RX ADMIN — KETOROLAC TROMETHAMINE 15 MG: 15 INJECTION, SOLUTION INTRAMUSCULAR; INTRAVENOUS at 19:16

## 2023-01-27 RX ADMIN — METHYLPREDNISOLONE SODIUM SUCCINATE 125 MG: 125 INJECTION, POWDER, FOR SOLUTION INTRAMUSCULAR; INTRAVENOUS at 19:16

## 2023-01-27 ASSESSMENT — ACTIVITIES OF DAILY LIVING (ADL)
ADLS_ACUITY_SCORE: 35
ADLS_ACUITY_SCORE: 35
ADLS_ACUITY_SCORE: 33

## 2023-01-27 ASSESSMENT — ENCOUNTER SYMPTOMS
NECK PAIN: 1
NUMBNESS: 1

## 2023-01-27 NOTE — LETTER
January 28, 2023      To Whom It May Concern:      Maximino Bone was seen in our Emergency Department today, 01/27/23.  I expect his condition to improve over the next 2-3 days.  He may return to work when improved.      Sincerely,        Natty Durham RN

## 2023-01-27 NOTE — LETTER
01/28/23      To Whom it may concern:    Luisa Bone was in our Emergency Department today, 01/27/23 with a patient who needed their assistance.  Please excuse them from work.      Sincerely,      Natty Durham RN

## 2023-01-27 NOTE — ED TRIAGE NOTES
Neck pain, back pain, and left arm pain. Was thinking it was just a kinked neck. Unable to lay down. First started 2 days ago and getting worse. Denies trauma. Unsure of cause. Decreased arm ROM r/t pain

## 2023-01-27 NOTE — LETTER
January 28, 2023      To Whom It May Concern:      Maximino Bone was seen in our Emergency Department today, 01/28/23.  I expect his condition to improve over the next 3 days.  He may return to work/school when improved.    Sincerely,        Dwight Starkey MD

## 2023-01-28 VITALS
SYSTOLIC BLOOD PRESSURE: 150 MMHG | OXYGEN SATURATION: 94 % | DIASTOLIC BLOOD PRESSURE: 102 MMHG | HEART RATE: 105 BPM | TEMPERATURE: 98.1 F | RESPIRATION RATE: 24 BRPM

## 2023-01-28 RX ORDER — LIDOCAINE 4 G/G
1 PATCH TOPICAL ONCE
Status: DISCONTINUED | OUTPATIENT
Start: 2023-01-28 | End: 2023-01-28 | Stop reason: HOSPADM

## 2023-01-28 RX ORDER — PREDNISONE 20 MG/1
TABLET ORAL
Qty: 10 TABLET | Refills: 0 | Status: SHIPPED | OUTPATIENT
Start: 2023-01-28

## 2023-01-28 RX ORDER — OXYCODONE HYDROCHLORIDE 5 MG/1
5 TABLET ORAL EVERY 6 HOURS PRN
Qty: 12 TABLET | Refills: 0 | Status: SHIPPED | OUTPATIENT
Start: 2023-01-28 | End: 2023-01-31

## 2023-01-28 RX ORDER — METHOCARBAMOL 500 MG/1
500 TABLET, FILM COATED ORAL 4 TIMES DAILY PRN
Qty: 40 TABLET | Refills: 0 | Status: SHIPPED | OUTPATIENT
Start: 2023-01-28

## 2023-01-28 RX ORDER — ONDANSETRON 4 MG/1
4 TABLET, ORALLY DISINTEGRATING ORAL EVERY 8 HOURS PRN
Qty: 10 TABLET | Refills: 0 | Status: SHIPPED | OUTPATIENT
Start: 2023-01-28 | End: 2023-01-31

## 2023-01-28 ASSESSMENT — ACTIVITIES OF DAILY LIVING (ADL): ADLS_ACUITY_SCORE: 35

## 2023-01-28 NOTE — DISCHARGE INSTRUCTIONS
Ice and heat. Motrin 600mg every 6-8 hours for pain. Take with food  Pepcid 20mg daily to use while taking other medications  Use sling for comfort  Follow up with spine clinic asap  Return for worsening symptoms

## 2023-01-28 NOTE — ED NOTES
Pt reports pain is better while at rest and has a little better range of motion. Nerve pain with movement is still unchanged and 8/10. Able to hold his head up off the bed while sitting up

## 2023-01-28 NOTE — ED PROVIDER NOTES
History     Chief Complaint:  Neck Pain       HPI   Maximino Bone is a 47 year old male who presents with worsening severe neck pain and left shoulder to elbow numbness. 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 He currently has no strength in his neck and not able to support his neck on his own. The pain and numbness came on together. He does not know what specifically caused this pain as he did normal CNA work and exercised. He denies prior neck surgery or back issues. He does not take any meds daily. He has a sensitive stomach.     Independent Historian: the patient and wife     Review of External Notes: past notes     ROS:  Review of Systems   Musculoskeletal: Positive for neck pain.   Neurological: Positive for numbness.   All other systems reviewed and are negative.      Allergies:  No Known Allergies     Medications:    Albuterol    Past Medical History:    Cellulitis  Hyperlipidemia  Anxiety  Asthma     Family History:    family history includes Diabetes in his mother; Hypertension in his mother; Psychotic Disorder in his daughter and son; Unknown/Adopted in his father.    Social History:   reports that he has never smoked. His smokeless tobacco use includes chew. He reports current alcohol use. He reports that he does not use drugs.  PCP: [facility]     Physical Exam     Patient Vitals for the past 24 hrs:   BP Temp Temp src Pulse Resp SpO2   01/28/23 0015 (!) 150/102 -- -- 105 -- 94 %   01/27/23 2030 -- -- -- 77 24 95 %   01/27/23 1714 (!) 158/120 98.1  F (36.7  C) Temporal 95 20 100 %        Physical Exam  Constitutional:       Appearance: He is well-developed.   HENT:      Right Ear: External ear normal.      Left Ear: External ear normal.      Mouth/Throat:      Mouth: Mucous membranes are moist.      Pharynx: Oropharynx is clear. No oropharyngeal exudate or posterior oropharyngeal erythema.   Eyes:       General: No scleral icterus.     Extraocular Movements: Extraocular movements intact.      Conjunctiva/sclera: Conjunctivae normal.      Pupils: Pupils are equal, round, and reactive to light.   Neck:      Comments: L side of neck TTP without swelling or erythema.  Cardiovascular:      Rate and Rhythm: Normal rate and regular rhythm.      Heart sounds: Normal heart sounds. No murmur heard.    No friction rub. No gallop.   Pulmonary:      Effort: Pulmonary effort is normal. No respiratory distress.      Breath sounds: Normal breath sounds. No wheezing or rales.   Abdominal:      General: Bowel sounds are normal. There is no distension.      Palpations: Abdomen is soft. There is no mass.      Tenderness: There is no abdominal tenderness.   Musculoskeletal:         General: Tenderness present.      Cervical back: Normal range of motion and neck supple. Tenderness present.      Comments: There is point tenderness in upper trapezius.  The pain is reproducible and palpation of this area seem to increase pain down his left biceps.  Strength is 5 out of 5 in bilateral extremity with normal pulses bilaterally.  There is no signs of swelling.  No temperature difference.  Cap refills normal.  The upper scapula seem to be tender and that radiates to the deltoid with range of motion.  Patient has trouble turning his neck to the left due to pain.  No midline tenderness.   Lymphadenopathy:      Cervical: No cervical adenopathy.   Skin:     General: Skin is warm and dry.      Capillary Refill: Capillary refill takes less than 2 seconds.      Findings: No rash.   Neurological:      General: No focal deficit present.      Mental Status: He is alert.      Sensory: Sensory deficit present.      Comments: Decrease in light touch sensation over the upper scapula.           Emergency Department Course     Imaging:  Cervical spine CT w/o contrast   Final Result   IMPRESSION:   1.  Mild cervical spondylosis without high-grade canal or  neural foraminal stenosis.      US Upper Extremity Venous Duplex Left   Final Result   IMPRESSION:    1.  No deep venous thrombosis in the left upper extremity.         Report per radiology    Laboratory:  Labs Ordered and Resulted from Time of ED Arrival to Time of ED Departure   BASIC METABOLIC PANEL - Abnormal       Result Value    Sodium 135 (*)     Potassium 4.3      Chloride 100      Carbon Dioxide (CO2) 22      Anion Gap 13      Urea Nitrogen 14.0      Creatinine 0.86      Calcium 9.0      Glucose 102 (*)     GFR Estimate >90     TROPONIN T, HIGH SENSITIVITY - Normal    Troponin T, High Sensitivity 8     CBC WITH PLATELETS AND DIFFERENTIAL    WBC Count 8.4      RBC Count 4.85      Hemoglobin 15.3      Hematocrit 44.1      MCV 91      MCH 31.5      MCHC 34.7      RDW 13.0      Platelet Count 266      % Neutrophils 54      % Lymphocytes 30      % Monocytes 11      % Eosinophils 4      % Basophils 1      % Immature Granulocytes 0      NRBCs per 100 WBC 0      Absolute Neutrophils 4.6      Absolute Lymphocytes 2.6      Absolute Monocytes 0.9      Absolute Eosinophils 0.3      Absolute Basophils 0.0      Absolute Immature Granulocytes 0.0      Absolute NRBCs 0.0        Emergency Department Course & Assessments:             Interventions:  Medications   HYDROmorphone (PF) (DILAUDID) injection 0.5 mg (0.5 mg Intravenous Given 1/27/23 2115)   Lidocaine (LIDOCARE) 4 % Patch 1 patch (has no administration in time range)   methylPREDNISolone sodium succinate (solu-MEDROL) injection 125 mg (125 mg Intravenous Given 1/27/23 1916)   diazepam (VALIUM) tablet 5 mg (5 mg Oral Given 1/27/23 2027)   oxyCODONE (ROXICODONE) tablet 5 mg (5 mg Oral Given 1/27/23 2027)   acetaminophen (TYLENOL) tablet 1,000 mg (1,000 mg Oral Given 1/27/23 1915)   ketorolac (TORADOL) injection 15 mg (15 mg Intravenous Given 1/27/23 1916)   ondansetron (ZOFRAN ODT) ODT tab 4 mg (4 mg Oral Not Given 1/27/23 2029)   HYDROmorphone (DILAUDID) injection 1  mg (1 mg Intravenous Given 1/27/23 2220)   HYDROmorphone (DILAUDID) injection 1 mg (1 mg Intravenous Given 1/27/23 2354)        Independent Interpretation (X-rays, CTs, rhythm strip):  none    Consultations/Discussion of Management or Tests:  none        Social Determinants of Health affecting care:    He is a CNA    Assessments:  2030 I obtained a history and examined the patient    2224 I rechecked the patient and explained findings  2305 I rechecked the patient and he can move his neck a little bit more  0036 I rechecked the patient    Disposition:  The patient was discharged to home.     Impression & Plan      Medical Decision Making:  Patient presented with gradual onset left-sided neck pain that became more painful in the scapula and now radiating down the arm.  He had a negative ultrasound as well as labs.  Most likely this is cervical radiculopathy.  He does have some improvement after Valium and 2 doses of Dilaudid.  We did do a CT imaging which did not show any severe stenosis.  I still think this is likely radiculopathy.  He will need outpatient MRI.  I did refer him to spine follow-up through [facility].  His arm was placed in a sling which seem to help significantly with his pain.  We discussed symptomatic treatment with Lidoderm patch, ibuprofen, prednisone and muscle relaxant along with oxycodone.  He is given sedation warning while taking this medication.  We did discuss also making sure he is eating food along with these medication.  He is comfortable with the follow-up plan.  He is given a work note for couple days off.  We did discuss return precautions and he is agreeable with that.  If symptoms worsen, he is asked to return.    Diagnosis:    ICD-10-CM    1. Neck pain  M54.2       2. Cervical radiculopathy  M54.12            Discharge Medications:  New Prescriptions    METHOCARBAMOL (ROBAXIN) 500 MG TABLET    Take 1 tablet (500 mg) by mouth 4 times daily as needed for muscle spasms     ONDANSETRON (ZOFRAN ODT) 4 MG ODT TAB    Take 1 tablet (4 mg) by mouth every 8 hours as needed for nausea    OXYCODONE (ROXICODONE) 5 MG TABLET    Take 1 tablet (5 mg) by mouth every 6 hours as needed for pain    PREDNISONE (DELTASONE) 20 MG TABLET    Take two tablets (= 40mg) each day for 5 (five) days          Scribe Disclosure:  I, Jamie Castañeda, am serving as a scribe at 8:26 PM on 1/27/2023 to document services personally performed by Dwight Starkey MD based on my observations and the provider's statements to me.     1/27/2023   Dwight Starkey MD Cheng, Wenlan, MD  01/28/23 0057

## 2023-01-28 NOTE — ED NOTES
"PIT/Triage Evaluation    Patient presented with pain on left neck to shoulder to arm. No sx past the elbow. Associated paresthesias and swelling in the arm. Started with a \"kink\" in the neck. No h/o of these symptoms. Cant think of any inciting injury.     Exam is notable for:  Intact strength LUE. L radial pulse intact. Subjective decreased sensation to light touch on the left upper arm relative to the right    Appropriate interventions for symptom management were initiated if applicable.  Appropriate diagnostic tests were initiated if indicated.    Important information for subsequent clinician:    I briefly evaluated the patient and developed an initial plan of care. I discussed this plan and explained that this brief interaction does not constitute a full evaluation. Patient/family understands that they should wait to be fully evaluated and discuss any test results with another clinician prior to leaving the hospital.       Mauro Martinez MD  01/27/23 0925    "

## 2023-01-30 LAB
ATRIAL RATE - MUSE: 83 BPM
DIASTOLIC BLOOD PRESSURE - MUSE: NORMAL MMHG
INTERPRETATION ECG - MUSE: NORMAL
P AXIS - MUSE: 57 DEGREES
PR INTERVAL - MUSE: 152 MS
QRS DURATION - MUSE: 106 MS
QT - MUSE: 376 MS
QTC - MUSE: 441 MS
R AXIS - MUSE: 5 DEGREES
SYSTOLIC BLOOD PRESSURE - MUSE: NORMAL MMHG
T AXIS - MUSE: 33 DEGREES
VENTRICULAR RATE- MUSE: 83 BPM

## 2023-03-27 ENCOUNTER — APPOINTMENT (OUTPATIENT)
Dept: GENERAL RADIOLOGY | Facility: CLINIC | Age: 48
End: 2023-03-27
Attending: EMERGENCY MEDICINE
Payer: COMMERCIAL

## 2023-03-27 ENCOUNTER — HOSPITAL ENCOUNTER (EMERGENCY)
Facility: CLINIC | Age: 48
Discharge: HOME | End: 2023-03-27
Attending: EMERGENCY MEDICINE | Admitting: EMERGENCY MEDICINE
Payer: COMMERCIAL

## 2023-03-27 ENCOUNTER — APPOINTMENT (OUTPATIENT)
Dept: CT IMAGING | Facility: CLINIC | Age: 48
End: 2023-03-27
Attending: EMERGENCY MEDICINE
Payer: COMMERCIAL

## 2023-03-27 VITALS
HEART RATE: 85 BPM | TEMPERATURE: 98.7 F | RESPIRATION RATE: 18 BRPM | OXYGEN SATURATION: 99 % | WEIGHT: 205 LBS | BODY MASS INDEX: 29.35 KG/M2 | DIASTOLIC BLOOD PRESSURE: 92 MMHG | HEIGHT: 70 IN | SYSTOLIC BLOOD PRESSURE: 143 MMHG

## 2023-03-27 DIAGNOSIS — S60.222A CONTUSION OF LEFT HAND, INITIAL ENCOUNTER: ICD-10-CM

## 2023-03-27 DIAGNOSIS — S70.02XA CONTUSION OF LEFT HIP, INITIAL ENCOUNTER: ICD-10-CM

## 2023-03-27 DIAGNOSIS — S20.212A CONTUSION OF LEFT CHEST WALL, INITIAL ENCOUNTER: ICD-10-CM

## 2023-03-27 DIAGNOSIS — S50.02XA CONTUSION OF LEFT ELBOW, INITIAL ENCOUNTER: ICD-10-CM

## 2023-03-27 LAB
CREAT BLD-MCNC: 1.1 MG/DL (ref 0.7–1.3)
GFR SERPL CREATININE-BSD FRML MDRD: >60 ML/MIN/1.73M2

## 2023-03-27 PROCEDURE — 73080 X-RAY EXAM OF ELBOW: CPT | Mod: LT

## 2023-03-27 PROCEDURE — 250N000013 HC RX MED GY IP 250 OP 250 PS 637: Performed by: EMERGENCY MEDICINE

## 2023-03-27 PROCEDURE — 99285 EMERGENCY DEPT VISIT HI MDM: CPT | Mod: 25

## 2023-03-27 PROCEDURE — 73502 X-RAY EXAM HIP UNI 2-3 VIEWS: CPT

## 2023-03-27 PROCEDURE — 96360 HYDRATION IV INFUSION INIT: CPT | Mod: 59

## 2023-03-27 PROCEDURE — 258N000003 HC RX IP 258 OP 636: Performed by: EMERGENCY MEDICINE

## 2023-03-27 PROCEDURE — 74177 CT ABD & PELVIS W/CONTRAST: CPT

## 2023-03-27 PROCEDURE — 73130 X-RAY EXAM OF HAND: CPT | Mod: LT

## 2023-03-27 PROCEDURE — 82565 ASSAY OF CREATININE: CPT

## 2023-03-27 PROCEDURE — 250N000011 HC RX IP 250 OP 636: Performed by: EMERGENCY MEDICINE

## 2023-03-27 PROCEDURE — 71101 X-RAY EXAM UNILAT RIBS/CHEST: CPT | Mod: LT

## 2023-03-27 RX ORDER — CYCLOBENZAPRINE HCL 10 MG
10 TABLET ORAL 3 TIMES DAILY PRN
Qty: 20 TABLET | Refills: 0 | Status: SHIPPED | OUTPATIENT
Start: 2023-03-27 | End: 2023-04-02

## 2023-03-27 RX ORDER — HYDROCODONE BITARTRATE AND ACETAMINOPHEN 5; 325 MG/1; MG/1
1 TABLET ORAL ONCE
Status: COMPLETED | OUTPATIENT
Start: 2023-03-27 | End: 2023-03-27

## 2023-03-27 RX ORDER — IOPAMIDOL 755 MG/ML
500 INJECTION, SOLUTION INTRAVASCULAR ONCE
Status: COMPLETED | OUTPATIENT
Start: 2023-03-27 | End: 2023-03-27

## 2023-03-27 RX ADMIN — HYDROCODONE BITARTRATE AND ACETAMINOPHEN 1 TABLET: 5; 325 TABLET ORAL at 10:06

## 2023-03-27 RX ADMIN — SODIUM CHLORIDE 100 ML: 9 INJECTION, SOLUTION INTRAVENOUS at 10:30

## 2023-03-27 RX ADMIN — IOPAMIDOL 100 ML: 755 INJECTION, SOLUTION INTRAVENOUS at 10:30

## 2023-03-27 ASSESSMENT — ENCOUNTER SYMPTOMS
ABDOMINAL PAIN: 1
SHORTNESS OF BREATH: 0
BACK PAIN: 0

## 2023-03-27 ASSESSMENT — ACTIVITIES OF DAILY LIVING (ADL): ADLS_ACUITY_SCORE: 35

## 2023-03-27 NOTE — ED PROVIDER NOTES
"  History     Chief Complaint:  Fall     The history is provided by the patient.      Maximino Boen is a 47 year old male who presents from a fall. The patient reports last night before heading to work he slipped and fell on his driveway landing on ice. He states he landed on his left side, left arm, and left hand  However notes most of his pain from the ribs on his left side. He states he went to work after the fall and has still been having pain on the left side of his abdomen. He denies having trouble with breathing. Denies pain in his back. Denies drinking alcohol.     Review of External Notes: Prior ER note reviewed with cervical radiculopathy.    ROS:  Review of Systems   Respiratory: Negative for shortness of breath.    Gastrointestinal: Positive for abdominal pain (Ribs on left side).   Musculoskeletal: Negative for back pain.   All other systems reviewed and are negative.    Allergies:  No Known Allergies     Medications:    The patient is currently on no regular medications.    Past Medical History:    Asthma   Anxiety   Hyperlipidemia    Family History:    family history includes Diabetes in his mother; Hypertension in his mother; Psychotic Disorder in his daughter and son; Unknown/Adopted in his father.    Social History:  reports that he has never smoked. His smokeless tobacco use includes chew. He reports current alcohol use. He reports that he does not use drugs.  The patient presents to the ED with spouse via private vehicle.   PCP: [facility]     Physical Exam     Patient Vitals for the past 24 hrs:   BP Temp Temp src Pulse Resp SpO2 Height Weight   03/27/23 1145 (!) 143/92 -- -- -- -- 99 % -- --   03/27/23 1130 (!) 151/99 -- -- -- -- 98 % -- --   03/27/23 1015 (!) 150/105 -- -- 85 -- 97 % -- --   03/27/23 1000 (!) 160/111 -- -- 60 -- -- -- --   03/27/23 0810 (!) 160/108 98.7  F (37.1  C) Temporal 91 18 97 % 1.778 m (5' 10\") 93 kg (205 lb)      Physical " Exam  Constitutional:       General: He is not in acute distress.     Appearance: Normal appearance. He is not toxic-appearing.   HENT:      Head: Atraumatic.      Nose: No congestion.   Eyes:      General: No scleral icterus.     Conjunctiva/sclera: Conjunctivae normal.   Cardiovascular:      Rate and Rhythm: Normal rate and regular rhythm.   Pulmonary:      Effort: Pulmonary effort is normal. No respiratory distress.   Abdominal:      General: Abdomen is flat.      Palpations: Abdomen is soft.      Comments: Left upper quadrant tenderness without ecchymosis or guarding.   Genitourinary:     Comments: No CVA tenderness  Musculoskeletal:         General: No swelling.      Cervical back: Neck supple. No tenderness.      Comments: No C-spine tenderness or step-off.  Full range of motion of the neck without pain.  No tenderness of the thoracic or lumbar spine.  Mild tenderness of the left olecranon.  Mild tenderness with ecchymosis of the left fifth metacarpal.  No wrist tenderness.  Specifically no scaphoid tenderness.  Full range of motion of the left shoulder, elbow, and wrist.  No sternoclavicular tenderness.  There is mild left inferior, anterior chest wall tenderness.  No step-off or crepitance.  No ecchymosis or rash.   Lymphadenopathy:      Cervical: No cervical adenopathy.   Skin:     General: Skin is warm.      Capillary Refill: Capillary refill takes less than 2 seconds.      Findings: No rash.   Neurological:      General: No focal deficit present.      Mental Status: He is alert and oriented to person, place, and time.      Comments:  strength 5 out of 5 neck bilaterally in the hands.  Intrinsic muscle strength normal in the hands bilaterally.  Flexion and extension at the bicep and tricep is 5 out of 5 and equal bilaterally.  Abduction at the shoulders is 5 out of 5 and equal bilaterally.  Sensation light touch is intact and symmetric over all distributions of the hands.   Psychiatric:         Mood  and Affect: Mood normal.         Behavior: Behavior normal.           Emergency Department Course     Imaging:  XR Pelvis w Hip Left 1 View   Final Result   IMPRESSION: Anatomic alignment left hip. No acute displaced left hip   fracture. Mild bilateral hip osteoarthritis. No acute displaced pelvic   fracture. Contrast material is present within the ureters and bladder   from recent CT scan.      KASIE BHATIA MD            SYSTEM ID:  [id number]      CT Chest/Abdomen/Pelvis w Contrast   Preliminary Result   IMPRESSION:    1. No evidence for acute traumatic injury in the chest, abdomen or   pelvis.   2. Colonic diverticulosis without CT evidence of acute atelectasis.      Ribs XR, unilat 3 views + PA chest,  left   Final Result   IMPRESSION: No acute pneumonic consolidation, pleural effusion,   pulmonary edema or pneumothorax. Heart size and mediastinal contours   are normal with normal pulmonary vascularity. No change in the   radiographic appearance of the chest compared with 9/18/2014. No acute   displaced inferior left-sided rib fracture identified.          KASIE BHATIA MD            SYSTEM ID:  [id number]      XR Elbow Left G/E 3 Views   Final Result   IMPRESSION: Anatomic alignment left elbow. No acute displaced left   elbow fracture. Elbow joint spaces are normal. No elbow joint   effusion.          KASIE BHATIA MD            SYSTEM ID:  [id number]      XR Hand Left G/E 3 Views   Final Result   IMPRESSION: Anatomic alignment left hand. No acute displaced left hand   fracture identified. No significant joint space narrowing. No   localizing left hand soft tissue swelling.          KASIE BHATIA MD            SYSTEM ID:  [id number]         Report per radiology    Laboratory:  Labs Ordered and Resulted from Time of ED Arrival to Time of ED Departure   ISTAT CREATININE POCT - Normal       Result Value    Creatinine POCT 1.1      GFR, ESTIMATED POCT >60     ISTAT CREATININE POCT        Emergency Department  Course & Assessments:     Interventions:  Medications   HYDROcodone-acetaminophen (NORCO) 5-325 MG per tablet 1 tablet (1 tablet Oral $Given 3/27/23 1006)   0.9% sodium chloride BOLUS (0 mLs Intravenous Stopped 3/27/23 1112)   iopamidol (ISOVUE-370) solution 500 mL (100 mLs Intravenous $Given 3/27/23 1030)        Assessments:  0935 I obtained patient's history and examined patient as noted above.   1150 Rechecked patient.     Independent Interpretation (X-rays, CTs, rhythm strip):  Left hip x-ray independently reviewed.  No fracture.    Disposition:  The patient was discharged to home.     Impression & Plan      Medical Decision Making:  This patient is a 47-year-old man who presents to the emergency department for evaluation following a fall yesterday evening.  He complains of left chest and abdominal pain.  X-ray of the ribs negative for fracture or pneumothorax.  Given that he had left upper quadrant tenderness with the potential for splenic or renal injury or pulmonary contusion he had a CT scan.  Fortunately does not demonstrate acute findings.  The remainder of his x-rays are negative.  He complains of some radicular pain along the left ulnar distribution but sensation and motor strength is intact.  Most likely this is neuropraxia.  The patient is appropriate for outpatient management and will continue to follow through his primary care clinic.    Diagnosis:    ICD-10-CM    1. Contusion of left chest wall, initial encounter  S20.212A       2. Contusion of left hip, initial encounter  S70.02XA       3. Contusion of left elbow, initial encounter  S50.02XA       4. Contusion of left hand, initial encounter  S60.222A            Discharge Medications:  New Prescriptions    CYCLOBENZAPRINE (FLEXERIL) 10 MG TABLET    Take 1 tablet (10 mg) by mouth 3 times daily as needed for muscle spasms      Scribe Disclosure:  Dianne MATTA, am serving as a scribe at 9:38 AM on 3/27/2023 to document services personally  performed by Delvin Mercer MD based on my observations and the provider's statements to me.     3/27/2023   Delvin Mercer MD McRoberts, Sean Edward, MD  03/27/23 1211

## 2023-03-27 NOTE — ED TRIAGE NOTES
ABCs intact. Pt slipped and fall onto L side last night. Denies hitting head or LOC. Pt c/o L hand, elbow and side pain. Declined medication during triage.

## 2023-03-27 NOTE — DISCHARGE INSTRUCTIONS
Return to the ER for any further problems.    Do not drive, use machinery, use alcohol, or use other sedating medications while taking Flexeril.

## 2023-04-01 ENCOUNTER — HEALTH MAINTENANCE LETTER (OUTPATIENT)
Age: 48
End: 2023-04-01

## 2023-04-21 ENCOUNTER — HOSPITAL ENCOUNTER (EMERGENCY)
Facility: CLINIC | Age: 48
Discharge: HOME | End: 2023-04-21
Admitting: EMERGENCY MEDICINE
Payer: COMMERCIAL

## 2023-04-21 VITALS
RESPIRATION RATE: 18 BRPM | WEIGHT: 200 LBS | DIASTOLIC BLOOD PRESSURE: 118 MMHG | SYSTOLIC BLOOD PRESSURE: 164 MMHG | HEIGHT: 70 IN | TEMPERATURE: 97.5 F | OXYGEN SATURATION: 99 % | BODY MASS INDEX: 28.63 KG/M2 | HEART RATE: 90 BPM

## 2023-04-21 DIAGNOSIS — K08.89 PAIN, DENTAL: ICD-10-CM

## 2023-04-21 PROCEDURE — 99283 EMERGENCY DEPT VISIT LOW MDM: CPT

## 2023-04-21 RX ORDER — HYDROCODONE BITARTRATE AND ACETAMINOPHEN 5; 325 MG/1; MG/1
1 TABLET ORAL EVERY 4 HOURS PRN
Qty: 15 TABLET | Refills: 0 | Status: SHIPPED | OUTPATIENT
Start: 2023-04-21

## 2023-04-21 ASSESSMENT — ENCOUNTER SYMPTOMS
NUMBNESS: 0
ABDOMINAL PAIN: 0
SORE THROAT: 0
CHILLS: 0
VOICE CHANGE: 0
SHORTNESS OF BREATH: 0
TROUBLE SWALLOWING: 0
FACIAL ASYMMETRY: 0
DIZZINESS: 0
FEVER: 0
WEAKNESS: 0

## 2023-04-21 ASSESSMENT — ACTIVITIES OF DAILY LIVING (ADL): ADLS_ACUITY_SCORE: 33

## 2023-04-21 NOTE — ED TRIAGE NOTES
Pt here with c/o R lower dental pain x 1 wk. Has seen dentist x2 this week and rx antibiotics and tylenol #3. Unable to pull tooth d/t pt's BP. Pt reports numbness to R jaw today. Denies HA, blurred vision, slurred speech, facial droop, arm drift or unsteady gait. Denies fevers. ABC intact. A&Ox4.

## 2023-04-21 NOTE — Clinical Note
Maximino Smitha was seen and treated in our emergency department on 4/21/2023.  He may return to work on 04/22/2023.  Please excuse from work tonight because of ER visit     If you have any questions or concerns, please don't hesitate to call.      Darell Strange MD

## 2023-04-21 NOTE — Clinical Note
Maximino Smitha was seen and treated in our emergency department on 4/21/2023.  He may return to work on 04/22/2023.  Please excuse from work tonight because of ER visit     If you have any questions or concerns, please don't hesitate to call.      aDrell Strange MD

## 2023-04-22 NOTE — ED PROVIDER NOTES
History     Chief Complaint:  Dental Problem       HPI   Maximino Bone is a 47 year old male with known dental infection and need for erosive diseases to right lower canine and premolar to be removed.  Out o fpain medicine was reason for visit tonight.  Also more pain along the nerve and pins and needles type feeling.  Already on amoxicillin.  No facial swelling.      Independent Historian:    Spouse/partner    Review of External Notes:      ROS:  Review of Systems   Constitutional: Negative for chills and fever.   HENT: Positive for dental problem. Negative for drooling, ear pain, mouth sores, sore throat, trouble swallowing and voice change.    Eyes: Negative for visual disturbance.   Respiratory: Negative for shortness of breath.    Cardiovascular: Negative for chest pain.   Gastrointestinal: Negative for abdominal pain.   Neurological: Negative for dizziness, facial asymmetry, weakness and numbness.   All other systems reviewed and are negative.      Allergies:  No Known Allergies     Medications:    HYDROcodone-acetaminophen (NORCO) 5-325 MG tablet  acetaminophen (TYLENOL) 325 MG tablet  albuterol (PROAIR HFA) 108 (90 Base) MCG/ACT inhaler  albuterol (PROAIR HFA, PROVENTIL HFA, VENTOLIN HFA) 108 (90 BASE) MCG/ACT inhaler  amoxicillin-clavulanate (AUGMENTIN) 875-125 MG per tablet  cyclobenzaprine (FLEXERIL) 10 MG tablet  HYDROcodone-acetaminophen (NORCO) 5-325 MG per tablet  ibuprofen (ADVIL/MOTRIN) 600 MG tablet  methocarbamol (ROBAXIN) 500 MG tablet  methylPREDNISolone (MEDROL DOSEPAK) 4 MG tablet  oxyCODONE-acetaminophen (PERCOCET) 5-325 MG tablet  predniSONE (DELTASONE) 20 MG tablet        Past Medical History:    No past medical history on file.    Past Surgical History:    No past surgical history on file.     Family History:    family history includes Diabetes in his mother; Hypertension in his mother; Psychotic Disorder in his daughter and son; Unknown/Adopted in his father.    Social History:    "reports that he has never smoked. His smokeless tobacco use includes chew. He reports current alcohol use. He reports that he does not use drugs.  PCP: [facility]     Physical Exam   Patient Vitals for the past 24 hrs:   BP Temp Temp src Pulse Resp SpO2 Height Weight   04/21/23 1641 (!) 164/118 97.5  F (36.4  C) Temporal 90 18 99 % 1.778 m (5' 10\") 90.7 kg (200 lb)        Physical Exam  General: Patient is well appearing. No distress.  Head: Atraumatic.  Eyes: Conjunctivae and EOM are normal. No scleral icterus.  Mouth poor dentition overall.  Many missing teeth.  Right lower canine and premolar erosive disease and all surfaces eroded.  No abscess.  No facial swelling.  No trismus.   Neck: Normal range of motion. Neck supple.   Cardiovascular: Normal rate, regular rhythm, normal heart sounds and intact distal pulses.   Pulmonary/Chest: Breath sounds normal. No respiratory distress.  Abdominal: Soft. Bowel sounds are normal. No distension. No tenderness. No rebound or guarding.   Musculoskeletal: Normal range of motion.  Skin: Warm and dry. No rash noted. Not diaphoretic.     Emergency Department Course       Emergency Department Course & Assessments:             Interventions:  Medications - No data to display     Independent Interpretation (X-rays, CTs, rhythm strip):       Consultations/Discussion of Management or Tests:          Social Determinants of Health affecting care:         Assessments:      Disposition:  The patient was discharged to home.     Impression & Plan        Medical Decision Making:  The patient presents with a tooth ache.  There is no abscess detected around the tooth amenable to incision and drainage.  The differential diagnosis includes: cracked tooth syndrome, pulpitis, sub-apical abscess, sinusitis, cellulitis amongst others.  There is no evidence of buccinator/canine space infections, significant facial swelling, or Ramu's angina.  There are no posterior " pharyngeal space infections detected, all oral-pharyngeal structures are midline without stridor.  Follow up with a dentist/endodontist in the coming days is indicated for further work up and treatment.      Diagnosis:    ICD-10-CM    1. Pain, dental  K08.89            Discharge Medications:  New Prescriptions    HYDROCODONE-ACETAMINOPHEN (NORCO) 5-325 MG TABLET    Take 1 tablet by mouth every 4 hours as needed for pain            4/21/2023   No att. providers found            Darell Strange MD  04/21/23 2034

## 2024-05-10 ENCOUNTER — HOSPITAL ENCOUNTER (EMERGENCY)
Facility: CLINIC | Age: 49
Discharge: HOME | End: 2024-05-10
Attending: EMERGENCY MEDICINE | Admitting: EMERGENCY MEDICINE
Payer: COMMERCIAL

## 2024-05-10 ENCOUNTER — APPOINTMENT (OUTPATIENT)
Dept: GENERAL RADIOLOGY | Facility: CLINIC | Age: 49
End: 2024-05-10
Attending: EMERGENCY MEDICINE
Payer: COMMERCIAL

## 2024-05-10 VITALS
WEIGHT: 210 LBS | OXYGEN SATURATION: 98 % | DIASTOLIC BLOOD PRESSURE: 124 MMHG | HEART RATE: 77 BPM | BODY MASS INDEX: 29.4 KG/M2 | RESPIRATION RATE: 18 BRPM | TEMPERATURE: 97.8 F | SYSTOLIC BLOOD PRESSURE: 154 MMHG | HEIGHT: 71 IN

## 2024-05-10 DIAGNOSIS — R22.42 LOCALIZED SWELLING OF LEFT FOOT: ICD-10-CM

## 2024-05-10 DIAGNOSIS — S90.32XA CONTUSION OF LEFT FOOT, INITIAL ENCOUNTER: ICD-10-CM

## 2024-05-10 DIAGNOSIS — R03.0 ELEVATED BLOOD PRESSURE READING WITHOUT DIAGNOSIS OF HYPERTENSION: ICD-10-CM

## 2024-05-10 PROCEDURE — 250N000013 HC RX MED GY IP 250 OP 250 PS 637: Performed by: EMERGENCY MEDICINE

## 2024-05-10 PROCEDURE — 73630 X-RAY EXAM OF FOOT: CPT | Mod: LT

## 2024-05-10 PROCEDURE — 99283 EMERGENCY DEPT VISIT LOW MDM: CPT

## 2024-05-10 RX ORDER — ACETAMINOPHEN 500 MG
1000 TABLET ORAL ONCE
Status: COMPLETED | OUTPATIENT
Start: 2024-05-10 | End: 2024-05-10

## 2024-05-10 RX ORDER — ACETAMINOPHEN 500 MG
1000 TABLET ORAL ONCE
Status: DISCONTINUED | OUTPATIENT
Start: 2024-05-10 | End: 2024-05-10

## 2024-05-10 RX ORDER — IBUPROFEN 600 MG/1
600 TABLET, FILM COATED ORAL ONCE
Status: COMPLETED | OUTPATIENT
Start: 2024-05-10 | End: 2024-05-10

## 2024-05-10 RX ADMIN — ACETAMINOPHEN 1000 MG: 500 TABLET, FILM COATED ORAL at 05:58

## 2024-05-10 RX ADMIN — IBUPROFEN 600 MG: 600 TABLET, FILM COATED ORAL at 07:20

## 2024-05-10 ASSESSMENT — COLUMBIA-SUICIDE SEVERITY RATING SCALE - C-SSRS
2. HAVE YOU ACTUALLY HAD ANY THOUGHTS OF KILLING YOURSELF IN THE PAST MONTH?: NO
6. HAVE YOU EVER DONE ANYTHING, STARTED TO DO ANYTHING, OR PREPARED TO DO ANYTHING TO END YOUR LIFE?: NO
1. IN THE PAST MONTH, HAVE YOU WISHED YOU WERE DEAD OR WISHED YOU COULD GO TO SLEEP AND NOT WAKE UP?: NO

## 2024-05-10 ASSESSMENT — ACTIVITIES OF DAILY LIVING (ADL): ADLS_ACUITY_SCORE: 33

## 2024-05-10 NOTE — ED PROVIDER NOTES
"  Emergency Department Note      History of Present Illness     Chief Complaint  Foot Pain    HPI  Maximino Bone is a 48 year old male who presents emergency department concerns for left foot pain and swelling.  He reports the day before yesterday he was walking down a hill.  He stumbled and the heel of his right boot hit the top of his left foot.  He notes he is not positive this is the cause of his pain but was after that that the pain and swelling started.  He notes he has been using ice and elevation.  He notes it still hurts and that is why he came to the emergency department.  He notes the lateral digits of his left foot are tingly but not numb.  He denies any color change.  He notes it hurts to try to lift up his foot.  He has been able to bear weight but with pain.    Independent Historian  None    Review of External Notes  None  Past Medical History   Medical History and Problem List  Migraine headaches    Medications  No daily medications    Surgical History   No past surgical history on file.  Physical Exam   Patient Vitals for the past 24 hrs:   BP Temp Pulse Resp SpO2 Height Weight   05/10/24 0552 (!) 173/126 97.8  F (36.6  C) 87 18 100 % 1.803 m (5' 11\") 95.3 kg (210 lb)     Physical Exam  General: Adult male sitting upright  CV: DP and PT pulses are palpable in the left foot and ankle.    MSK: Tenderness and mild soft tissue swelling over the dorsal aspect of the lateral left foot.  No palpable crepitus or bony deformity.  Nontender over the ankle or the digits of the left foot.  Is able to range the digits of the left foot.  Is able to range at the left ankle that problem.  Skin: Warm and dry. No rashes or lesions or ecchymoses on visible skin.  Neuro: Alert and oriented. Responds appropriately to all questions and commands. No focal findings appreciated. Normal muscle tone.  Sensation intact to light touch over the entire left foot and digits.  Psych: Normal mood and affect.   Diagnostics "   Imaging  Foot XR, G/E 3 views, left   Final Result   IMPRESSION: Normal joint spaces and alignment. No fracture.          Independent Interpretation  I reviewed the patient's foot x-ray.  I do not see evidence of fracture.    ED Course    Medications Administered  Medications   ibuprofen (ADVIL/MOTRIN) tablet 600 mg (has no administration in time range)   acetaminophen (TYLENOL) tablet 1,000 mg (1,000 mg Oral $Given 5/10/24 5772)       Discussion of Management  None    Social Determinants of Health adding to complexity of care  None    ED Course  Past medical records, nursing notes, and vitals reviewed.  0705: I performed an exam of the patient and obtained history, as documented above.  I discussed findings of today's x-ray and examination.  I discussed plan  .  Medical Decision Making / Diagnosis   MDM  Maximino Bone is a 48 year old male who presents emergency department with pain and swelling of his left foot after minor mechanism of injury.  X-rays do not show Insa fracture.  His neurovascular status is normal.  He likely is suffering from strain or contusion.  He is placed in a cam boot due to the pain he is having recommended crutches with weightbearing as tolerated.  He understands that there may be an underlying fracture that is missed on today's x-rays and therefore he should seek reassessment with ongoing pain with an orthopedist or primary care provider in next 3 to 5 days if conservative treatment is not improving his pain.  At this time he is no sign of infection and I do not suspect DVT as a cause for the localized swelling on the dorsum of his left foot.  He understands however the importance of returning should symptoms worsen including loss of color, severe pain, increasing swelling to the affected extremity.  Ibuprofen and/or Tylenol, rest, ice, cam boot with crutches as needed recommended.  All questions answered prior to discharge.  Please note that incidentally his blood pressure was noted  to be elevated.  He is recommend follow-up with primary care provider for recheck on follow-up or within 1 to 2 weeks.    Disposition  The patient was discharged.     ICD-10 Codes:    ICD-10-CM    1. Localized swelling of left foot  R22.42       2. Contusion of left foot, initial encounter  S90.32XA       3. Elevated blood pressure reading without diagnosis of hypertension  R03.0            Rachael Hernandez MD    [employer]        Rachael Hernandez MD  05/10/24 0721

## 2024-05-10 NOTE — ED TRIAGE NOTES
Here with wife for left foot pain and swelling for 2 days. States slipped in mud on Tuesday and now since, is having difficulty lifting foot. Hit top of foot with heel of boot of other foot. Also hurts when walking on it. CMS in tact. Area swollen, skin color WNL, skin temperature also WNL. States  has been wrapping and elevating foot without relief.

## 2024-05-10 NOTE — DISCHARGE INSTRUCTIONS
Discharge Instructions  Extremity Injury    You were seen today for an injury to an extremity (arm, hand, leg, or foot). You may have a bruise, strain, or fracture (broken bone).  There is no sign of fracture on today's xray.    Generally, every Emergency Department visit should have a follow-up clinic visit with either a primary or a specialty clinic/provider. Please follow-up as instructed by your emergency provider today.  Return to the Emergency Department right away if:  Your pain seems to change or get worse or there is pain in a new area that wasn t evaluated today.  Your extremity becomes pale, cool, blue, or numb or tingling past the injury.  You have more drainage, redness or pain in the area of the cut or abrasion.  You have pain that you cannot control with the medicine recommended or prescribed here, or you have pain that seems too much for your injury.  Your child (who is injured) will not stop crying or is much more fussy than normal.  You have new symptoms or anything that worries you.    What to Expect:  Your swelling and pain may be worse the day after your injury, but should not be severe and should start getting better after that. You should not have new symptoms and your pain should not get worse.  You may start to get a bruise over the injured area or below the injured area (bruising can follow gravity).  Your movement and strength should get better with time.  Some injuries may not show up until after you have left the Emergency Department so it is important to follow-up as directed.  Your injury may prevent you from working.  Follow-up with your regular provider to get a work release note.  Pain medications or your injury may make it unsafe to drive or operate machinery.    Home Care:  RICE: Rest, Ice, Compression, Elevation  Rest: Rest your injured area for at least 1-2 days. After that you may start using your extremity again as long as there is not too much pain.   Ice: Apply ice your  injured area for 15 minutes at a time, at least 3 times a day. Use a cloth between the ice bag and your skin to prevent frostbite. Do not sleep with an ice pack or heating pad on, since this can cause burns or skin injury.  Compression: You may use an elastic bandage (Ace  Wrap) if it makes you more comfortable. Wrap it just tight enough to provide light compression, like a new pair of socks feels. Loosen the bandage if you have swelling past the bandage.  Elevation: Raise the injured area above the level of your heart as much as possible in the first 1-2 days.    Use Tylenol  (acetaminophen), Motrin (ibuprofen), or Advil  (ibuprofen) for your pain unless you have an allergy or are told not to use these medications by your provider.  Take the medications as instructed on the package. Tylenol  (acetaminophen) is in many prescription medicines and non-prescription medicines--check all of your medicines to be sure you aren t taking more than 3000 mg per day.  Please follow any other instructions that were discussed with you by your provider.    Stretching/Exercises:  You may have been provided with instructions for stretching or exercises. If your injury was to your arm or shoulder and your provider put you in a sling or an immobilizer, it is important that you take off your immobilizer within 3 days and stretch/move your shoulder, unless your provider specifically tells you to not move your shoulder.  This is to prevent further injury such as a  frozen shoulder .     If you were given a prescription for medicine here today, be sure to read all of the information (including the package insert) that comes with your prescription.  This will include important information about the medicine, its side effects, and any warnings that you need to know about.  The pharmacist who fills the prescription can provide more information and answer questions you may have about the medicine.  If you have questions or concerns that the  pharmacist cannot address, please call or return to the Emergency Department.     Remember that you can always come back to the Emergency Department if you are not able to see your regular provider in the amount of time listed above, if you get any new symptoms, or if there is anything that worries you.     Discharge Instructions  Hypertension - High Blood Pressure    During you visit to the Emergency Department, your blood pressure was higher than the recommended blood pressure.  This may be related to stress, pain, medication or other temporary conditions. In these cases, your blood pressure may return to normal on its own. If you have a history of high blood pressure, you may need to have your provider adjust your medications. Sometimes, your high measurement here may indicate that you have developed high blood pressure that will stay high unless it is treated. As a general rule, high blood pressure causes problems over years rather than days, weeks, or months. So, while it is important to treat blood pressure, it is rarely important to treat blood pressure immediately. Occasionally we will begin a medication in the Emergency Department; more often we will recommend close follow-up for medications with a primary doctor/clinic.    Generally, every Emergency Department visit should have a follow-up clinic visit with either a primary or a specialty clinic/provider. Please follow-up as instructed by your emergency provider today.    Return to the Emergency Department if you start to have:  A severe headache.  Chest pain.  Shortness of breath.  Weakness or numbness that affects one part of the body.  Confusion.  Vision changes.  Significant swelling of legs and/or eyes.  A reaction to any medication started in the Emergency Department.    What can I do to help myself?  Avoid alcohol.  Take any blood pressure medicine that you are prescribed.  Get a good night s sleep.  Lower your salt intake.  Exercise.  Lose  weight.  Manage stress.  See your doctor regularly    If blood pressure medication was started in the Emergency Department:  The medicine may not have an immediate effect. The body and brain determine what blood pressure you have. The medicine s job is to retrain the body s  thermostat  to a lower blood pressure.  You will need to follow up with your provider to see how this medicine is working for you.  If you were given a prescription for medicine here today, be sure to read all of the information (including the package insert) that comes with your prescription.  This will include important information about the medicine, its side effects, and any warnings that you need to know about.  The pharmacist who fills the prescription can provide more information and answer questions you may have about the medicine.  If you have questions or concerns that the pharmacist cannot address, please call or return to the Emergency Department.   Remember that you can always come back to the Emergency Department if you are not able to see your regular provider in the amount of time listed above, if you get any new symptoms, or if there is anything that worries you.

## 2024-05-10 NOTE — Clinical Note
Maximino Bone was seen and treated in our emergency department on 5/10/2024.  He may return to work on 05/13/2024.  Upon return to work, may perform duties that allow for use of walking boot and crutches, weight bearing as tolerated on the left leg for one week.     If you have any questions or concerns, please don't hesitate to call.      Rachael Hernandez MD

## 2024-06-01 ENCOUNTER — HEALTH MAINTENANCE LETTER (OUTPATIENT)
Age: 49
End: 2024-06-01

## 2024-07-19 ENCOUNTER — OFFICE VISIT (OUTPATIENT)
Dept: URGENT CARE | Facility: URGENT CARE | Age: 49
End: 2024-07-19
Payer: COMMERCIAL

## 2024-07-19 ENCOUNTER — HOSPITAL ENCOUNTER (EMERGENCY)
Facility: CLINIC | Age: 49
Discharge: HOME | End: 2024-07-19
Attending: EMERGENCY MEDICINE | Admitting: EMERGENCY MEDICINE
Payer: COMMERCIAL

## 2024-07-19 VITALS
TEMPERATURE: 97.4 F | DIASTOLIC BLOOD PRESSURE: 91 MMHG | OXYGEN SATURATION: 100 % | RESPIRATION RATE: 20 BRPM | SYSTOLIC BLOOD PRESSURE: 146 MMHG | HEART RATE: 66 BPM

## 2024-07-19 VITALS
OXYGEN SATURATION: 98 % | HEART RATE: 90 BPM | TEMPERATURE: 97 F | DIASTOLIC BLOOD PRESSURE: 86 MMHG | HEIGHT: 70 IN | WEIGHT: 200.18 LBS | BODY MASS INDEX: 28.66 KG/M2 | RESPIRATION RATE: 20 BRPM | SYSTOLIC BLOOD PRESSURE: 137 MMHG

## 2024-07-19 DIAGNOSIS — R19.7 DIARRHEA, UNSPECIFIED TYPE: Primary | ICD-10-CM

## 2024-07-19 DIAGNOSIS — R10.84 ABDOMINAL PAIN, GENERALIZED: ICD-10-CM

## 2024-07-19 DIAGNOSIS — A09 DIARRHEA OF INFECTIOUS ORIGIN: ICD-10-CM

## 2024-07-19 LAB
ALBUMIN SERPL BCG-MCNC: 4 G/DL (ref 3.5–5.2)
ALP SERPL-CCNC: 100 U/L (ref 40–150)
ALT SERPL W P-5'-P-CCNC: 14 U/L (ref 0–70)
ANION GAP SERPL CALCULATED.3IONS-SCNC: 11 MMOL/L (ref 7–15)
AST SERPL W P-5'-P-CCNC: 16 U/L (ref 0–45)
BASOPHILS # BLD AUTO: 0 10E3/UL (ref 0–0.2)
BASOPHILS NFR BLD AUTO: 0 %
BILIRUB SERPL-MCNC: 0.6 MG/DL
BUN SERPL-MCNC: 7.4 MG/DL (ref 6–20)
CALCIUM SERPL-MCNC: 8.9 MG/DL (ref 8.8–10.4)
CHLORIDE SERPL-SCNC: 101 MMOL/L (ref 98–107)
CREAT SERPL-MCNC: 0.89 MG/DL (ref 0.67–1.17)
EGFRCR SERPLBLD CKD-EPI 2021: >90 ML/MIN/1.73M2
EOSINOPHIL # BLD AUTO: 0.3 10E3/UL (ref 0–0.7)
EOSINOPHIL NFR BLD AUTO: 4 %
ERYTHROCYTE [DISTWIDTH] IN BLOOD BY AUTOMATED COUNT: 12.8 % (ref 10–15)
GLUCOSE SERPL-MCNC: 95 MG/DL (ref 70–99)
HCO3 SERPL-SCNC: 25 MMOL/L (ref 22–29)
HCT VFR BLD AUTO: 46 % (ref 40–53)
HGB BLD-MCNC: 15.4 G/DL (ref 13.3–17.7)
IMM GRANULOCYTES # BLD: 0 10E3/UL
IMM GRANULOCYTES NFR BLD: 0 %
LIPASE SERPL-CCNC: 16 U/L (ref 13–60)
LYMPHOCYTES # BLD AUTO: 2.1 10E3/UL (ref 0.8–5.3)
LYMPHOCYTES NFR BLD AUTO: 24 %
MCH RBC QN AUTO: 30 PG (ref 26.5–33)
MCHC RBC AUTO-ENTMCNC: 33.5 G/DL (ref 31.5–36.5)
MCV RBC AUTO: 90 FL (ref 78–100)
MONOCYTES # BLD AUTO: 1 10E3/UL (ref 0–1.3)
MONOCYTES NFR BLD AUTO: 11 %
NEUTROPHILS # BLD AUTO: 5.3 10E3/UL (ref 1.6–8.3)
NEUTROPHILS NFR BLD AUTO: 61 %
NRBC # BLD AUTO: 0 10E3/UL
NRBC BLD AUTO-RTO: 0 /100
PLATELET # BLD AUTO: 282 10E3/UL (ref 150–450)
POTASSIUM SERPL-SCNC: 4 MMOL/L (ref 3.4–5.3)
PROT SERPL-MCNC: 7.3 G/DL (ref 6.4–8.3)
RBC # BLD AUTO: 5.13 10E6/UL (ref 4.4–5.9)
SODIUM SERPL-SCNC: 137 MMOL/L (ref 135–145)
WBC # BLD AUTO: 8.8 10E3/UL (ref 4–11)

## 2024-07-19 PROCEDURE — 83690 ASSAY OF LIPASE: CPT | Performed by: EMERGENCY MEDICINE

## 2024-07-19 PROCEDURE — 258N000003 HC RX IP 258 OP 636: Performed by: EMERGENCY MEDICINE

## 2024-07-19 PROCEDURE — 99284 EMERGENCY DEPT VISIT MOD MDM: CPT | Mod: 25

## 2024-07-19 PROCEDURE — 96376 TX/PRO/DX INJ SAME DRUG ADON: CPT

## 2024-07-19 PROCEDURE — 80053 COMPREHEN METABOLIC PANEL: CPT | Performed by: EMERGENCY MEDICINE

## 2024-07-19 PROCEDURE — 250N000013 HC RX MED GY IP 250 OP 250 PS 637: Performed by: EMERGENCY MEDICINE

## 2024-07-19 PROCEDURE — 96361 HYDRATE IV INFUSION ADD-ON: CPT

## 2024-07-19 PROCEDURE — 85025 COMPLETE CBC W/AUTO DIFF WBC: CPT | Performed by: EMERGENCY MEDICINE

## 2024-07-19 PROCEDURE — 99203 OFFICE O/P NEW LOW 30 MIN: CPT | Performed by: PHYSICIAN ASSISTANT

## 2024-07-19 PROCEDURE — 36415 COLL VENOUS BLD VENIPUNCTURE: CPT | Performed by: EMERGENCY MEDICINE

## 2024-07-19 PROCEDURE — 96374 THER/PROPH/DIAG INJ IV PUSH: CPT

## 2024-07-19 PROCEDURE — 250N000011 HC RX IP 250 OP 636: Performed by: EMERGENCY MEDICINE

## 2024-07-19 RX ORDER — KETOROLAC TROMETHAMINE 15 MG/ML
15 INJECTION, SOLUTION INTRAMUSCULAR; INTRAVENOUS ONCE
Status: COMPLETED | OUTPATIENT
Start: 2024-07-19 | End: 2024-07-19

## 2024-07-19 RX ORDER — DICYCLOMINE HCL 20 MG
20 TABLET ORAL 2 TIMES DAILY
Qty: 20 TABLET | Refills: 0 | Status: SHIPPED | OUTPATIENT
Start: 2024-07-19 | End: 2024-07-29

## 2024-07-19 RX ORDER — DICYCLOMINE HCL 20 MG
20 TABLET ORAL
Status: DISCONTINUED | OUTPATIENT
Start: 2024-07-19 | End: 2024-07-19 | Stop reason: HOSPADM

## 2024-07-19 RX ORDER — KETOROLAC TROMETHAMINE 10 MG/1
10 TABLET, FILM COATED ORAL EVERY 6 HOURS PRN
Qty: 20 TABLET | Refills: 0 | Status: SHIPPED | OUTPATIENT
Start: 2024-07-19

## 2024-07-19 RX ADMIN — KETOROLAC TROMETHAMINE 15 MG: 15 INJECTION, SOLUTION INTRAMUSCULAR; INTRAVENOUS at 06:40

## 2024-07-19 RX ADMIN — DICYCLOMINE HYDROCHLORIDE 20 MG: 20 TABLET ORAL at 06:44

## 2024-07-19 RX ADMIN — KETOROLAC TROMETHAMINE 15 MG: 15 INJECTION, SOLUTION INTRAMUSCULAR; INTRAVENOUS at 07:49

## 2024-07-19 RX ADMIN — SODIUM CHLORIDE 1000 ML: 9 INJECTION, SOLUTION INTRAVENOUS at 06:39

## 2024-07-19 ASSESSMENT — COLUMBIA-SUICIDE SEVERITY RATING SCALE - C-SSRS
1. IN THE PAST MONTH, HAVE YOU WISHED YOU WERE DEAD OR WISHED YOU COULD GO TO SLEEP AND NOT WAKE UP?: NO
6. HAVE YOU EVER DONE ANYTHING, STARTED TO DO ANYTHING, OR PREPARED TO DO ANYTHING TO END YOUR LIFE?: NO
2. HAVE YOU ACTUALLY HAD ANY THOUGHTS OF KILLING YOURSELF IN THE PAST MONTH?: NO

## 2024-07-19 ASSESSMENT — ACTIVITIES OF DAILY LIVING (ADL)
ADLS_ACUITY_SCORE: 35
ADLS_ACUITY_SCORE: 35

## 2024-07-19 NOTE — DISCHARGE INSTRUCTIONS
We have ordered oiutpatient stool studies due to egg ingestionand diarrhea. REturn to ER with severe increasein abdomianl pain or fever greater than 100.4.. Please bring stool to lab if diarrhea is persistant. OK to use imodium if diarrhea is frequent

## 2024-07-19 NOTE — LETTER
July 19, 2024      Maximino Bone  [address] [place] [id number]        To Whom It May Concern:    Maximino Bone was seen here today for evaluation of an acute medical problem. Please excuse him from missed work 7/18/24 through Sunday 7/21/24.     Any further work absence and/or work restrictions will be as per primary care team.       Sincerely,        Rohan Sharma PA-C

## 2024-07-19 NOTE — PROGRESS NOTES
ASSESSMENT/PLAN:    MDM: Generalized abdominal pain and diarrhea in 49 year old male. ER evaluation prior to UC visit has been reviewed by me (I also reviewed with patient). Treatment as per above ER. Patient has stool culture order from ER and will bring back sample to ER/Hospital.     I reviewed criteria for urgent and emergent follow-up with patient myself today.     His requested note for missed work was provided (please see letter in Epic)     This progress note has been dictated, with use of voice recognition software. Any grammatical, typographical, or context errors are unintentional and inherent to use of voice recognition software.  ----------------    Chief Complaint   Patient presents with    Urgent Care     Pt presents with stomach ache and diarrhea X 3 days. Pt just came from the ER today, was given pain medications ( Toradol and bentyl) but says he is still in so much pain. Needs a note to excuse him for work until Sunday.     Maximino Bone is a 49 year old male who presents to urgent care, accompanied by his wife, requesting a note for missed work due to generalized abdominal pain and diarrhea.     Patient was seen already in the emergency room for evaluation and treatment. The ER MD note is not  yet signed, but the below blue text is copy/paste from today's ER visit. I reviewed the below with patient/patient confirms the below.     Patient tells me he forgot to ask for a work absence during his ER visit today--he went back to ER and requested a note, but states he was told they will not provide any letters after ER discharge/states he was told to get one from PCP. Patient does not have a PCP, so presents here     Add'l ROS: No acute fever or chills. No acute worsening of abdominal pain since ER discharge. No focal abdominal pain. No black or bloody stools. No N/V/D. No CP or SOB. No associated UTI symptoms or flank pain.       ER NOTE REVIEW:    Chief Complaint   Abdominal Pain     HPI  "  Maximino Bone is a generally healthy 49 year old male presenting with abdominal pain. For the past two days (7/17/24), the patient's upper stomach and lower gut has been \"turning inside out\" and cramping intermittently, possibly due to \"bad eggs\". Nothing brings on the pain. Patient endorses frequent diarrhea. Denies vomiting. No history of abdominal surgery. No pain medications taken.    Lab Results         Labs Ordered and Resulted from Time of ED Arrival to Time of ED Departure   COMPREHENSIVE METABOLIC PANEL - Normal       Result Value      Sodium 137        Potassium 4.0        Carbon Dioxide (CO2) 25        Anion Gap 11        Urea Nitrogen 7.4        Creatinine 0.89        GFR Estimate >90        Calcium 8.9        Chloride 101        Glucose 95        Alkaline Phosphatase 100        AST 16        ALT 14        Protein Total 7.3        Albumin 4.0        Bilirubin Total 0.6      LIPASE - Normal     Lipase 16      CBC WITH PLATELETS AND DIFFERENTIAL     WBC Count 8.8        RBC Count 5.13        Hemoglobin 15.4        Hematocrit 46.0        MCV 90        MCH 30.0        MCHC 33.5        RDW 12.8        Platelet Count 282        % Neutrophils 61        % Lymphocytes 24        % Monocytes 11        % Eosinophils 4        % Basophils 0        % Immature Granulocytes 0        NRBCs per 100 WBC 0        Absolute Neutrophils 5.3        Absolute Lymphocytes 2.1        Absolute Monocytes 1.0        Absolute Eosinophils 0.3        Absolute Basophils 0.0        Absolute Immature Granulocytes 0.0        Absolute NRBCs 0.0      ENTERIC BACTERIA AND VIRUS PANEL BY PCR         Imaging   No orders to display       Medications Administered   Medications   dicyclomine (BENTYL) tablet 20 mg (20 mg Oral $Given 7/19/24 0644)   sodium chloride 0.9% BOLUS 1,000 mL (0 mLs Intravenous Stopped 7/19/24 7182)   ketorolac (TORADOL) injection 15 mg (15 mg Intravenous $Given 7/19/24 0640)   ketorolac (TORADOL) injection 15 mg (15 mg " Intravenous $Given 7/19/24 0749)           OBJECTIVE:  BP (!) 146/91   Pulse 66   Temp 97.4  F (36.3  C) (Tympanic)   Resp 20   SpO2 100%     General appearance: alert. NAD   Skin color is uniform in color and without rash.  HEENT:   Conjunctiva not injected.  Sclera clear.  NECK: Trachea is midline  CARDIAC:NORMAL - regular rate and rhythm without murmur.  RESP: Normal - CTA without rales, rhonchi, or wheezing.  ABDOMEN: Abdomen soft. He is generally tender on exam of all 4 quadrants. No focal pain.  BS normal. No masses, organomegaly. No flank pain.   NEURO: Alert and oriented.  Normal speech and mentation.  CN II/XII grossly intact.  Gait within normal limits.              (R19.7) Diarrhea, unspecified type  (primary encounter diagnosis)    (R10.84) Abdominal pain, generalized
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
## 2025-04-21 NOTE — ED AVS SNAPSHOT
[facility] Emergency Department    [address]    [facility] [address]    Phone:  [phone]    Fax:  [phone]                                       Maximino Bone   MRN: [id number]    Department:  [facility] Emergency Department   Date of Visit:  10/1/2017           Patient Information     Date Of Birth          1975        Your diagnoses for this visit were:     Vomiting and diarrhea        You were seen by Unruly Ivan MD.      Follow-up Information     Follow up with Jackie Kumar MD In 3 days.    Specialty:  Internal Medicine    Contact information:    [address]  [facility] [address]  [phone]          Discharge Instructions       Discharge Instructions  Vomiting and Diarrhea    You have been seen today for vomiting and diarrhea. This is usually caused by a virus, but some bacteria, parasites, medicines or other medical conditions can cause similar symptoms. At this time your doctor does not find that your vomiting and diarrhea is a sign of anything dangerous or life-threatening.  However, sometimes the signs of serious illness do not show up right away.  If you have new or worse symptoms, you may need to be seen again in the emergency department or by your primary doctor. Remember that serious problems like appendicitis can look like gastroenteritis at first.       Return to the Emergency Department if:    You keep throwing up and you are not able to keep liquids down.    You feel you are getting dehydrated, such as being very thirsty, not urinating at least every 8-12 hours, or feeling faint or lightheaded.     You develop a new fever, or your fever continues for more than 2 days.    You have belly pain that seems worse than cramps, is in one spot, or is getting worse over time.     You have blood in your vomit or in your diarrhea.    You feel very weak     You are not starting to improve within 24 hours of your visit  Per chart review last order with [facility] was   (CPT®) - GC STOCKING THIGH LNGTH 40-50 none  6 6    - GRAD COMPRESS STOCKNG NOS none  1 1     [employer] model 3513 Type AG size lll, open toe, full thigh stocking, silicone border, short, 3 pair 1 refill  1 pair nighttime compression stockings   here    What can I do to help myself?    The most important thing to do is to drink clear liquids.  If you have been vomiting a lot, it is best to have only small, frequent sips of liquids.  Drinking too much at once may cause more vomiting. If you are vomiting often, you must replace minerals, sodium and potassium lost with your illness. Pedialyte  and sports drinks can help you replace these minerals.  You can also drink clear liquids such as water, weak tea, apple juice, and 7-up. Avoid acid liquids (orange), caffeine (coffee) or alcohol. Do not drink milk until you no longer have diarrhea.    After liquids are staying down, you may start eating mild foods. Soda crackers, toast, plain noodles, gelatin, applesauce and bananas are good first choices.  Avoid foods that have acid, are spicy, fatty or fibrous (such as meats, coarse grains, vegetables). You may start eating these foods again in about 3 days when you are better.    Sometimes treatment includes prescription medicine to prevent nausea and vomiting and to prevent diarrhea. If your doctor prescribes these for you, take them as directed.    Nonprescription medicine is available for the treatment of diarrhea and can be very effective.  If you use it, make sure you use the dose recommended on the package.  Avoid Lomotil. Check with your healthcare provider before you use any medicine for diarrhea.    Don t take ibuprofen, or other nonsteroidal anti-inflammatory medicines without checking with your healthcare provider.      Remember that you can always come back to the Emergency Department if you are not able to see your regular doctor in the amount of time listed above, if you get any new symptoms, or if there is anything that worries you.      24 Hour Appointment Hotline       To make an appointment at any [facility], call [phone] ([phone]). If you don't have a family doctor or clinic, we will help you find one. [facility] are  conveniently located to serve the needs of you and your family.             Review of your medicines      START taking        Dose / Directions Last dose taken    ondansetron 4 MG ODT tab   Commonly known as:  ZOFRAN ODT   Dose:  4 mg   Quantity:  10 tablet        Take 1 tablet (4 mg) by mouth every 8 hours as needed for nausea   Refills:  0          Our records show that you are taking the medicines listed below. If these are incorrect, please call your family doctor or clinic.        Dose / Directions Last dose taken    albuterol 108 (90 BASE) MCG/ACT Inhaler   Commonly known as:  PROAIR HFA/PROVENTIL HFA/VENTOLIN HFA   Dose:  2 puff   Quantity:  1 Inhaler        Inhale 2 puffs into the lungs every 2 hours as needed for shortness of breath / dyspnea   Refills:  0        doxycycline 100 MG capsule   Commonly known as:  VIBRAMYCIN   Dose:  100 mg   Quantity:  20 capsule        Take 1 capsule (100 mg) by mouth 2 times daily   Refills:  0        LORazepam 1 MG tablet   Commonly known as:  ATIVAN   Dose:  1 mg   Quantity:  20 tablet        Take 1 tablet (1 mg) by mouth every 8 hours as needed for anxiety   Refills:  0                Prescriptions were sent or printed at these locations (1 Prescription)                   Other Prescriptions                Printed at Department/Unit printer (1 of 1)         ondansetron (ZOFRAN ODT) 4 MG ODT tab                Procedures and tests performed during your visit     Basic metabolic panel (BMP)    CBC + differential      Orders Needing Specimen Collection     None      Pending Results     No orders found from 9/29/2017 to 10/2/2017.            Pending Culture Results     No orders found from 9/29/2017 to 10/2/2017.            Pending Results Instructions     If you had any lab results that were not finalized at the time of your Discharge, you can call the ED Lab Result RN at [phone]. You will be contacted by this team for any positive Lab results or changes in treatment.  The nurses are available 7 days a week from 10A to 6:30P.  You can leave a message 24 hours per day and they will return your call.        Test Results From Your Hospital Stay        10/1/2017  3:02 AM      Component Results     Component Value Ref Range & Units Status    WBC 7.5 4.0 - 11.0 10e9/L Final    RBC Count 5.56 4.4 - 5.9 10e12/L Final    Hemoglobin 17.3 13.3 - 17.7 g/dL Final    Hematocrit 50.1 40.0 - 53.0 % Final    MCV 90 78 - 100 fl Final    MCH 31.1 26.5 - 33.0 pg Final    MCHC 34.5 31.5 - 36.5 g/dL Final    RDW 13.5 10.0 - 15.0 % Final    Platelet Count 286 150 - 450 10e9/L Final    Diff Method Automated Method  Final    % Neutrophils 58.5 % Final    % Lymphocytes 35.3 % Final    % Monocytes 4.5 % Final    % Eosinophils 1.1 % Final    % Basophils 0.3 % Final    % Immature Granulocytes 0.3 % Final    Nucleated RBCs 0 0 /100 Final    Absolute Neutrophil 4.4 1.6 - 8.3 10e9/L Final    Absolute Lymphocytes 2.7 0.8 - 5.3 10e9/L Final    Absolute Monocytes 0.3 0.0 - 1.3 10e9/L Final    Absolute Eosinophils 0.1 0.0 - 0.7 10e9/L Final    Absolute Basophils 0.0 0.0 - 0.2 10e9/L Final    Abs Immature Granulocytes 0.0 0 - 0.4 10e9/L Final    Absolute Nucleated RBC 0.0  Final         10/1/2017  3:04 AM      Component Results     Component Value Ref Range & Units Status    Sodium 137 133 - 144 mmol/L Final    Potassium 4.1 3.4 - 5.3 mmol/L Final    Chloride 103 94 - 109 mmol/L Final    Carbon Dioxide 27 20 - 32 mmol/L Final    Anion Gap 7 3 - 14 mmol/L Final    Glucose 138 (H) 70 - 99 mg/dL Final    Urea Nitrogen 17 7 - 30 mg/dL Final    Creatinine 1.01 0.66 - 1.25 mg/dL Final    GFR Estimate 80 >60 mL/min/1.7m2 Final    Non  GFR Calc    GFR Estimate If Black >90 >60 mL/min/1.7m2 Final    African American GFR Calc    Calcium 9.5 8.5 - 10.1 mg/dL Final                Clinical Quality Measure: Blood Pressure Screening     Your blood pressure was checked while you were in the emergency department  "today. The last reading we obtained was  BP: (!) 136/102 . Please read the guidelines below about what these numbers mean and what you should do about them.  If your systolic blood pressure (the top number) is less than 120 and your diastolic blood pressure (the bottom number) is less than 80, then your blood pressure is normal. There is nothing more that you need to do about it.  If your systolic blood pressure (the top number) is 120-139 or your diastolic blood pressure (the bottom number) is 80-89, your blood pressure may be higher than it should be. You should have your blood pressure rechecked within a year by a primary care provider.  If your systolic blood pressure (the top number) is 140 or greater or your diastolic blood pressure (the bottom number) is 90 or greater, you may have high blood pressure. High blood pressure is treatable, but if left untreated over time it can put you at risk for heart attack, stroke, or kidney failure. You should have your blood pressure rechecked by a primary care provider within the next 4 weeks.  If your provider in the emergency department today gave you specific instructions to follow-up with your doctor or provider even sooner than that, you should follow that instruction and not wait for up to 4 weeks for your follow-up visit.        Thank you for choosing [facility]       Thank you for choosing [facility] for your care. Our goal is always to provide you with excellent care. Hearing back from our patients is one way we can continue to improve our services. Please take a few minutes to complete the written survey that you may receive in the mail after you visit with us. Thank you!        [employer] | VIP Information     [employer] lets you send messages to your doctor, view your test results, renew your prescriptions, schedule appointments and more. To sign up, go to [email or web address] . Click on \"Log in\" on the left side of the screen, which will take you to the Welcome page. Then " "click on \"Sign up Now\" on the right side of the page.     You will be asked to enter the access code listed below, as well as some personal information. Please follow the directions to create your username and password.     Your access code is: [email or web address]  Expires: 2017  4:24 AM     Your access code will  in 90 days. If you need help or a new code, please call your [facility] clinic or [phone].        Care EveryWhere ID     This is your Care EveryWhere ID. This could be used by other organizations to access your [facility] medical records  [id number]        Equal Access to Services     [facility]: Hadmicky Gray, sherry mckeon, hemant melgoza, dawson adams . So [facility] [phone].    ATENCIÓN: Si habla español, tiene a roque disposición servicios gratuitos de asistencia lingüística. Llame al [phone].    We comply with applicable federal civil rights laws and [place] laws. We do not discriminate on the basis of race, color, national origin, age, disability, sex, sexual orientation, or gender identity.            After Visit Summary       This is your record. Keep this with you and show to your community pharmacist(s) and doctor(s) at your next visit.                  "

## 2025-06-14 ENCOUNTER — HEALTH MAINTENANCE LETTER (OUTPATIENT)
Age: 50
End: 2025-06-14